# Patient Record
Sex: MALE | Race: WHITE | Employment: FULL TIME
[De-identification: names, ages, dates, MRNs, and addresses within clinical notes are randomized per-mention and may not be internally consistent; named-entity substitution may affect disease eponyms.]

---

## 2022-04-13 ENCOUNTER — NURSE TRIAGE (OUTPATIENT)
Dept: OTHER | Facility: CLINIC | Age: 57
End: 2022-04-13

## 2022-04-13 NOTE — TELEPHONE ENCOUNTER
Received call from Moy Mayberry at [facility] with [facility]. Subjective: Caller states \"I have been feeling SOB more so now than before. I had to quit smoking because I would get SOB really easily\"     Current Symptoms:   SOB on exertion  Fatigued faster  Nasal drainage    Denies wheezing, swelling, chest pain, cough, or fever  Hx: quit smoking about 5 years ago, familial hx of MI and afib,    Onset: a few months ago; gradual    Associated Symptoms: NA    Pain Severity: denies    Temperature: denies    What has been tried: nothing    LMP: NA Pregnant: NA    Recommended disposition: See HCP within 4 Hours (or PCP triage)     Pt is not established with a PCP. Pt advised to utilize UC/ED for now but would be transferred to [facility]) to establish care. Care advice provided, patient verbalizes understanding; denies any other questions or concerns; instructed to call back for any new or worsening symptoms. Patient/Caller agrees with recommended disposition; writer provided warm transfer to Anais Dasilva at [facility] for appointment scheduling     Attention Provider: Thank you for allowing me to participate in the care of your patient. The patient was connected to triage in response to information provided to the ECC/PSC. Please do not respond through this encounter as the response is not directed to a shared pool.       Reason for Disposition   [1] Longstanding difficulty breathing (e.g., CHF, COPD, emphysema) AND [2] WORSE than normal    Protocols used: BREATHING DIFFICULTY-ADULT-AH

## 2022-04-14 ENCOUNTER — OFFICE VISIT (OUTPATIENT)
Dept: INTERNAL MEDICINE | Age: 57
End: 2022-04-14
Payer: COMMERCIAL

## 2022-04-14 VITALS
SYSTOLIC BLOOD PRESSURE: 112 MMHG | OXYGEN SATURATION: 96 % | DIASTOLIC BLOOD PRESSURE: 82 MMHG | WEIGHT: 182 LBS | HEART RATE: 96 BPM | HEIGHT: 72 IN | BODY MASS INDEX: 24.65 KG/M2

## 2022-04-14 DIAGNOSIS — Z87.891 PERSONAL HISTORY OF TOBACCO USE: Primary | ICD-10-CM

## 2022-04-14 DIAGNOSIS — R53.83 FATIGUE, UNSPECIFIED TYPE: ICD-10-CM

## 2022-04-14 DIAGNOSIS — J44.9 CHRONIC OBSTRUCTIVE PULMONARY DISEASE, UNSPECIFIED COPD TYPE (HCC): ICD-10-CM

## 2022-04-14 DIAGNOSIS — Z87.891 PERSONAL HISTORY OF TOBACCO USE: ICD-10-CM

## 2022-04-14 DIAGNOSIS — R06.02 SOB (SHORTNESS OF BREATH): ICD-10-CM

## 2022-04-14 LAB
ALBUMIN SERPL-MCNC: 4.5 G/DL (ref 3.5–4.6)
ALP BLD-CCNC: 83 U/L (ref 35–104)
ALT SERPL-CCNC: <5 U/L (ref 0–41)
ANION GAP SERPL CALCULATED.3IONS-SCNC: 10 MEQ/L (ref 9–15)
AST SERPL-CCNC: <5 U/L (ref 0–40)
BASOPHILS ABSOLUTE: 0.1 K/UL (ref 0–0.2)
BASOPHILS RELATIVE PERCENT: 0.9 %
BILIRUB SERPL-MCNC: 0.4 MG/DL (ref 0.2–0.7)
BUN BLDV-MCNC: 13 MG/DL (ref 6–20)
CALCIUM SERPL-MCNC: 9.2 MG/DL (ref 8.5–9.9)
CHLORIDE BLD-SCNC: 103 MEQ/L (ref 95–107)
CHOLESTEROL, TOTAL: 187 MG/DL (ref 0–199)
CO2: 26 MEQ/L (ref 20–31)
CREAT SERPL-MCNC: 0.77 MG/DL (ref 0.7–1.2)
EOSINOPHILS ABSOLUTE: 0.2 K/UL (ref 0–0.7)
EOSINOPHILS RELATIVE PERCENT: 2.1 %
GFR AFRICAN AMERICAN: >60
GFR NON-AFRICAN AMERICAN: >60
GLOBULIN: 3.1 G/DL (ref 2.3–3.5)
GLUCOSE BLD-MCNC: 91 MG/DL (ref 70–99)
HCT VFR BLD CALC: 46 % (ref 42–52)
HDLC SERPL-MCNC: 41 MG/DL (ref 40–59)
HEMOGLOBIN: 15.8 G/DL (ref 14–18)
LDL CHOLESTEROL CALCULATED: 128 MG/DL (ref 0–129)
LYMPHOCYTES ABSOLUTE: 1.7 K/UL (ref 1–4.8)
LYMPHOCYTES RELATIVE PERCENT: 22.9 %
MCH RBC QN AUTO: 29.6 PG (ref 27–31.3)
MCHC RBC AUTO-ENTMCNC: 34.3 % (ref 33–37)
MCV RBC AUTO: 86.4 FL (ref 80–100)
MONOCYTES ABSOLUTE: 0.5 K/UL (ref 0.2–0.8)
MONOCYTES RELATIVE PERCENT: 6.2 %
NEUTROPHILS ABSOLUTE: 5 K/UL (ref 1.4–6.5)
NEUTROPHILS RELATIVE PERCENT: 67.9 %
PDW BLD-RTO: 13.9 % (ref 11.5–14.5)
PLATELET # BLD: 257 K/UL (ref 130–400)
POTASSIUM SERPL-SCNC: 5.5 MEQ/L (ref 3.4–4.9)
RBC # BLD: 5.32 M/UL (ref 4.7–6.1)
SODIUM BLD-SCNC: 139 MEQ/L (ref 135–144)
TOTAL PROTEIN: 7.6 G/DL (ref 6.3–8)
TRIGL SERPL-MCNC: 88 MG/DL (ref 0–150)
WBC # BLD: 7.3 K/UL (ref 4.8–10.8)

## 2022-04-14 PROCEDURE — 93000 ELECTROCARDIOGRAM COMPLETE: CPT | Performed by: FAMILY MEDICINE

## 2022-04-14 PROCEDURE — G0296 VISIT TO DETERM LDCT ELIG: HCPCS | Performed by: FAMILY MEDICINE

## 2022-04-14 PROCEDURE — 99204 OFFICE O/P NEW MOD 45 MIN: CPT | Performed by: FAMILY MEDICINE

## 2022-04-14 SDOH — ECONOMIC STABILITY: FOOD INSECURITY: WITHIN THE PAST 12 MONTHS, YOU WORRIED THAT YOUR FOOD WOULD RUN OUT BEFORE YOU GOT MONEY TO BUY MORE.: NEVER TRUE

## 2022-04-14 SDOH — ECONOMIC STABILITY: FOOD INSECURITY: WITHIN THE PAST 12 MONTHS, THE FOOD YOU BOUGHT JUST DIDN'T LAST AND YOU DIDN'T HAVE MONEY TO GET MORE.: NEVER TRUE

## 2022-04-14 ASSESSMENT — ENCOUNTER SYMPTOMS
SORE THROAT: 0
VOICE CHANGE: 0
VOMITING: 0
CHEST TIGHTNESS: 0
COUGH: 1
SHORTNESS OF BREATH: 1
DIARRHEA: 0
TROUBLE SWALLOWING: 0
BLOOD IN STOOL: 0
ABDOMINAL PAIN: 0
CHOKING: 0

## 2022-04-14 ASSESSMENT — PATIENT HEALTH QUESTIONNAIRE - PHQ9
1. LITTLE INTEREST OR PLEASURE IN DOING THINGS: 0
4. FEELING TIRED OR HAVING LITTLE ENERGY: 0
SUM OF ALL RESPONSES TO PHQ9 QUESTIONS 1 & 2: 0
7. TROUBLE CONCENTRATING ON THINGS, SUCH AS READING THE NEWSPAPER OR WATCHING TELEVISION: 0
2. FEELING DOWN, DEPRESSED OR HOPELESS: 0
3. TROUBLE FALLING OR STAYING ASLEEP: 0
10. IF YOU CHECKED OFF ANY PROBLEMS, HOW DIFFICULT HAVE THESE PROBLEMS MADE IT FOR YOU TO DO YOUR WORK, TAKE CARE OF THINGS AT HOME, OR GET ALONG WITH OTHER PEOPLE: 0
SUM OF ALL RESPONSES TO PHQ QUESTIONS 1-9: 0
5. POOR APPETITE OR OVEREATING: 0
SUM OF ALL RESPONSES TO PHQ QUESTIONS 1-9: 0
SUM OF ALL RESPONSES TO PHQ QUESTIONS 1-9: 0
9. THOUGHTS THAT YOU WOULD BE BETTER OFF DEAD, OR OF HURTING YOURSELF: 0
8. MOVING OR SPEAKING SO SLOWLY THAT OTHER PEOPLE COULD HAVE NOTICED. OR THE OPPOSITE, BEING SO FIGETY OR RESTLESS THAT YOU HAVE BEEN MOVING AROUND A LOT MORE THAN USUAL: 0
SUM OF ALL RESPONSES TO PHQ QUESTIONS 1-9: 0
6. FEELING BAD ABOUT YOURSELF - OR THAT YOU ARE A FAILURE OR HAVE LET YOURSELF OR YOUR FAMILY DOWN: 0

## 2022-04-14 ASSESSMENT — SOCIAL DETERMINANTS OF HEALTH (SDOH): HOW HARD IS IT FOR YOU TO PAY FOR THE VERY BASICS LIKE FOOD, HOUSING, MEDICAL CARE, AND HEATING?: NOT HARD AT ALL

## 2022-04-14 NOTE — PROGRESS NOTES
Subjective:      Patient ID: Shaunna Lilly is a 60 y.o. male who presents today for:  Chief Complaint   Patient presents with   [address] PCP. Pt had a little milk in coffee at 730. Pt is having SOB, Since he quit smoking 3yrs ago. Says by the EOD he is very fatigued. HPI Andres is a 49-year-old man with a history of 30+ years of smoking, however he quit 2 to 3 years ago. Patient has shortness of breath that has slowly gotten worse over the past few years. He stopped smoking due to the shortness of breath. Patient does not exercise much he does get more short of breath when he walks upstairs. No chest pain at rest or with exertion. No history of cardiac disease. Shortness of breath is always there and makes him feel very tired by the end of the day. Occasional cough with small amount of sputum production. Cough and sputum production have improved since 2 to 3 years ago. No fever. No history of Covid infection. Patient has had 2 of his Covid vaccines but no blisters. History reviewed. No pertinent past medical history. Past Surgical History:   Procedure Laterality Date    APPENDECTOMY      EYE SURGERY      TONSILLECTOMY      UPPER GASTROINTESTINAL ENDOSCOPY  11/30/12    DR Sky Berrios     Social History     Socioeconomic History    Marital status:      Spouse name: Not on file    Number of children: Not on file    Years of education: Not on file    Highest education level: Not on file   Occupational History    Not on file   Tobacco Use    Smoking status: Former Smoker     Packs/day: 1.00     Years: 40.00     Pack years: 40.00     Types: Cigarettes     Start date: 1982     Quit date: 2019     Years since quitting: 3.2    Smokeless tobacco: Never Used   Substance and Sexual Activity    Alcohol use:  Yes    Drug use: No    Sexual activity: Not on file   Other Topics Concern    Not on file   Social History Narrative    Not on file     Social Determinants of Health     Financial Resource Strain: Low Risk     Difficulty of Paying Living Expenses: Not hard at all   Food Insecurity: No Food Insecurity    Worried About Running Out of Food in the Last Year: Never true    [place]ia of Food in the Last Year: Never true   Transportation Needs:     Lack of Transportation (Medical): Not on file    Lack of Transportation (Non-Medical): Not on file   Physical Activity:     Days of Exercise per Week: Not on file    Minutes of Exercise per Session: Not on file   Stress:     Feeling of Stress : Not on file   Social Connections:     Frequency of Communication with Friends and Family: Not on file    Frequency of Social Gatherings with Friends and Family: Not on file    Attends Mor[employer] Services: Not on file    Active Member of [address] [place] [address] [employer] or Organizations: Not on file    Attends Club or Organization Meetings: Not on file    Marital Status: Not on file   Intimate Partner Violence:     Fear of Current or Ex-Partner: Not on file    Emotionally Abused: Not on file    Physically Abused: Not on file    Sexually Abused: Not on file   Housing Stability:     Unable to Pay for Housing in the Last Year: Not on file    Number of [place] in the Last Year: Not on file    Unstable Housing in the Last Year: Not on file     Family History   Problem Relation Age of Onset    Other Mother     Diabetes Father     Atrial Fibrillation Brother      No Known Allergies      Review of Systems   Constitutional: Negative for diaphoresis, fatigue and fever. HENT: Negative for congestion, sore throat, trouble swallowing and voice change. Eyes: Negative for visual disturbance. Respiratory: Positive for cough and shortness of breath. Negative for choking and chest tightness. Cardiovascular: Negative for chest pain, palpitations and leg swelling. Gastrointestinal: Negative for abdominal pain, blood in stool, diarrhea and vomiting.    Genitourinary: Negative for decreased urine volume and difficulty urinating. Musculoskeletal: Negative for joint swelling and myalgias. Skin: Negative for rash. Skin plaque on stomach for 2-3 years, his father has same one per patient   Allergic/Immunologic: Negative for immunocompromised state. Neurological: Negative for dizziness, syncope, speech difficulty, weakness, light-headedness and headaches. Hematological: Negative for adenopathy. Psychiatric/Behavioral: Negative for confusion and self-injury. Objective:   /82 (Site: Right Upper Arm, Position: Sitting, Cuff Size: Large Adult)   Pulse 96   Ht 5' 11.5\" (1.816 m)   Wt 182 lb (82.6 kg)   SpO2 96%   BMI 25.03 kg/m²     Physical Exam  Vitals reviewed. Constitutional:       General: He is not in acute distress. Appearance: Normal appearance. He is not ill-appearing or toxic-appearing. HENT:      Head: Normocephalic. Mouth/Throat:      Mouth: Mucous membranes are moist.      Pharynx: Oropharynx is clear. No oropharyngeal exudate or posterior oropharyngeal erythema. Eyes:      Extraocular Movements: Extraocular movements intact. Conjunctiva/sclera: Conjunctivae normal.      Pupils: Pupils are equal, round, and reactive to light. Cardiovascular:      Rate and Rhythm: Normal rate and regular rhythm. Heart sounds: Normal heart sounds. Pulmonary:      Effort: Pulmonary effort is normal. No respiratory distress. Breath sounds: No wheezing, rhonchi or rales. Comments: Decreased breath sounds throughout  Abdominal:      General: Abdomen is flat. Bowel sounds are normal. There is no distension. Palpations: Abdomen is soft. There is no mass. Tenderness: There is no abdominal tenderness. There is no guarding. Musculoskeletal:         General: Normal range of motion. Cervical back: Normal range of motion and neck supple. Right lower leg: No edema. Left lower leg: No edema. Lymphadenopathy:      Cervical: No cervical adenopathy. Skin:     General: Skin is warm and dry. Findings: No rash. Comments: [id number] dry scaly plaque on right abdomen   Neurological:      General: No focal deficit present. Mental Status: He is alert and oriented to person, place, and time. Cranial Nerves: No cranial nerve deficit. Sensory: No sensory deficit. Motor: No weakness. Psychiatric:         Mood and Affect: Mood normal.         Behavior: Behavior normal.         Thought Content: Thought content normal.         Judgment: Judgment normal.         Assessment:       Diagnosis Orders   1. Personal history of tobacco use  Cologuard    Lipid Panel    CBC with Auto Differential    Comprehensive Metabolic Panel    [place] VISIT TO DISCUSS LUNG CA SCREEN W LDCT    CT Lung Screen (Annual)    Ambulatory referral to Pulmonology   2. Fatigue, unspecified type  Cologuard    Lipid Panel    CBC with Auto Differential    Comprehensive Metabolic Panel    [place] VISIT TO DISCUSS LUNG CA SCREEN W LDCT    CT Lung Screen (Annual)    EKG 12 lead    EKG 12 lead    Ambulatory referral to Pulmonology   3. SOB (shortness of breath)  Cologuard    Lipid Panel    CBC with Auto Differential    Comprehensive Metabolic Panel    [place] VISIT TO DISCUSS LUNG CA SCREEN W LDCT    CT Lung Screen (Annual)    Ambulatory referral to Pulmonology    fluticasone-salmeterol (ADVAIR DISKUS) 250-50 MCG/DOSE AEPB   4. Chronic obstructive pulmonary disease, unspecified COPD type ([facility] [address]  fluticasone-salmeterol (ADVAIR DISKUS) 250-50 MCG/DOSE AEPB         Plan:      Orders Placed This Encounter   Procedures    Cologuard    CT Lung Screen (Annual)     Age: Patient is 60 y.o.     Smoking History: Social History    Tobacco Use      Smoking status: Former Smoker        Packs/day: 1.00        Years: 40.00        Pack years: 40        Types: Cigarettes        Start date: 1982        Quit date: 2019        Years since quitting: 3.2      Smokeless tobacco: Never Used    Alcohol use: Yes    Drug use: 1 puff into the lungs every 12 hours     Dispense:  60 each     Refill:  3       Return in about 3 weeks (around 5/5/2022) for symptom review. Return to clinic sooner if symptoms worsen. Go to emergency room with severe shortness of breath, chest pain, dizziness, syncope, or confusion. I recommended patient see dermatology for plaque on right abdomen, however patient refused at this time. Patient is also not interested in any further cardiac evaluation at this time-he wishes to focus on his breathing and see pulmonology first.  I discussed with him that shortness of breath is likely secondary to COPD from greater than 30 years of smoking but that it can also be caused by cardiac disease.       Nazario Orozco MD

## 2022-04-14 NOTE — PROGRESS NOTES
Low Dose CT (LDCT) Lung Screening criteria met:     Age 50-77(Medicare) or 50-80 ([facility])   Pack year smoking >20   Still smoking or less than 15 year since quit   No sign or symptoms of lung cancer   > 11 months since last LDCT     Risks and benefits of lung cancer screening with LDCT scans discussed:    Significance of positive screen - False-positive LDCT results often occur. 95% of all positive results do not lead to a diagnosis of cancer. Usually further imaging can resolve most false-positive results; however, some patients may require invasive procedures. Over diagnosis risk - 10% to 12% of screen-detected lung cancer cases are over diagnosed--that is, the cancer would not have been detected in the patient's lifetime without the screening. Need for follow up screens annually to continue lung cancer screening effectiveness     Risks associated with radiation from annual LDCT- Radiation exposure is about the same as for a mammogram, which is about 1/3 of the annual background radiation exposure from everyday life. Starting screening at age 54 is not likely to increase cancer risk from radiation exposure. Patients with comorbidities resulting in life expectancy of < 10 years, or that would preclude treatment of an abnormality identified on CT, should not be screened due to lack of benefit.     To obtain maximal benefit from this screening, smoking cessation and long-term abstinence from smoking is critical

## 2022-04-18 ENCOUNTER — TELEPHONE (OUTPATIENT)
Dept: CASE MANAGEMENT | Age: 57
End: 2022-04-18

## 2022-04-18 DIAGNOSIS — E87.5 HYPERKALEMIA: Primary | ICD-10-CM

## 2022-04-18 NOTE — TELEPHONE ENCOUNTER
Physician documentation on smoking history and CT Lung Screening reviewed. All required documentation complete. Patient is a former smoker (quit 2019) with a 40 pack year history ( 1 ppd x 40 years) per physician documentation.

## 2022-04-18 NOTE — PROGRESS NOTES
I spoke with patient Friday on the phone to notify him that his potassium level was high. He was feeling well- no chest pain, palpitations or dizziness. His breathing was improved. I told him to go to ER if he is feeling unwell and to have his potassium level repeated when office reopened. Pt was agreeable.

## 2022-04-26 ENCOUNTER — HOSPITAL ENCOUNTER (OUTPATIENT)
Dept: CT IMAGING | Age: 57
Discharge: HOME | End: 2022-04-28
Payer: COMMERCIAL

## 2022-04-26 DIAGNOSIS — Z87.891 PERSONAL HISTORY OF TOBACCO USE: ICD-10-CM

## 2022-04-26 DIAGNOSIS — R53.83 FATIGUE, UNSPECIFIED TYPE: ICD-10-CM

## 2022-04-26 DIAGNOSIS — R06.02 SOB (SHORTNESS OF BREATH): ICD-10-CM

## 2022-04-26 PROCEDURE — 71271 CT THORAX LUNG CANCER SCR C-: CPT

## 2022-05-05 ENCOUNTER — OFFICE VISIT (OUTPATIENT)
Dept: INTERNAL MEDICINE | Age: 57
End: 2022-05-05
Payer: COMMERCIAL

## 2022-05-05 VITALS
HEIGHT: 72 IN | DIASTOLIC BLOOD PRESSURE: 82 MMHG | OXYGEN SATURATION: 96 % | WEIGHT: 189 LBS | TEMPERATURE: 97.2 F | HEART RATE: 103 BPM | SYSTOLIC BLOOD PRESSURE: 120 MMHG | BODY MASS INDEX: 25.6 KG/M2

## 2022-05-05 DIAGNOSIS — J43.9 PULMONARY EMPHYSEMA, UNSPECIFIED EMPHYSEMA TYPE (HCC): ICD-10-CM

## 2022-05-05 DIAGNOSIS — E87.5 HYPERKALEMIA: ICD-10-CM

## 2022-05-05 DIAGNOSIS — Z00.00 HEALTH CARE MAINTENANCE: ICD-10-CM

## 2022-05-05 DIAGNOSIS — R94.31 ABNORMAL EKG: ICD-10-CM

## 2022-05-05 DIAGNOSIS — Z00.00 HEALTH CARE MAINTENANCE: Primary | ICD-10-CM

## 2022-05-05 LAB
ANION GAP SERPL CALCULATED.3IONS-SCNC: 16 MEQ/L (ref 9–15)
BUN BLDV-MCNC: 12 MG/DL (ref 6–20)
CALCIUM SERPL-MCNC: 9.4 MG/DL (ref 8.5–9.9)
CHLORIDE BLD-SCNC: 97 MEQ/L (ref 95–107)
CO2: 24 MEQ/L (ref 20–31)
CREAT SERPL-MCNC: 0.92 MG/DL (ref 0.7–1.2)
GFR AFRICAN AMERICAN: >60
GFR NON-AFRICAN AMERICAN: >60
GLUCOSE BLD-MCNC: 100 MG/DL (ref 70–99)
POTASSIUM SERPL-SCNC: 4.8 MEQ/L (ref 3.4–4.9)
SODIUM BLD-SCNC: 137 MEQ/L (ref 135–144)

## 2022-05-05 PROCEDURE — 99213 OFFICE O/P EST LOW 20 MIN: CPT | Performed by: FAMILY MEDICINE

## 2022-05-05 RX ORDER — ALBUTEROL SULFATE 90 UG/1
2 AEROSOL, METERED RESPIRATORY (INHALATION) EVERY 6 HOURS PRN
Qty: 1 EACH | Refills: 3 | Status: SHIPPED | OUTPATIENT
Start: 2022-05-05

## 2022-05-05 ASSESSMENT — ENCOUNTER SYMPTOMS
SHORTNESS OF BREATH: 1
VOMITING: 0
TROUBLE SWALLOWING: 0
DIARRHEA: 0
ABDOMINAL PAIN: 0
SORE THROAT: 0

## 2022-05-05 NOTE — PROGRESS NOTES
Subjective:      Patient ID: Keke Longoria is a 60 y.o. male who presents today for:  Chief Complaint   Patient presents with    Follow-up     SOB, inhaler disk is helping, go over lung screen CT       HPI  Patient states he has noticed some improvement since starting the Advair discus. However he still has periods of shortness of breath especially when he is exerting himself. His difficulty breathing has been consistent for several years now. No sudden worsening of breathing. No chest pain. No history of coronary artery disease. Patient has appointment to see pulmonology next week. No fever. No sputum. No palpitations or dizziness. History reviewed. No pertinent past medical history. Past Surgical History:   Procedure Laterality Date    APPENDECTOMY      EYE SURGERY      TONSILLECTOMY      UPPER GASTROINTESTINAL ENDOSCOPY  11/30/12    DR Amrit Avendaño     Social History     Socioeconomic History    Marital status:      Spouse name: Not on file    Number of children: Not on file    Years of education: Not on file    Highest education level: Not on file   Occupational History    Not on file   Tobacco Use    Smoking status: Former Smoker     Packs/day: 1.00     Years: 40.00     Pack years: 40.00     Types: Cigarettes     Start date: 1982     Quit date: 2019     Years since quitting: 3.3    Smokeless tobacco: Never Used   Substance and Sexual Activity    Alcohol use: Yes    Drug use: No    Sexual activity: Not on file   Other Topics Concern    Not on file   Social History Narrative    Not on file     Social Determinants of Health     Financial Resource Strain: Low Risk     Difficulty of Paying Living Expenses: Not hard at all   Food Insecurity: No Food Insecurity    Worried About Running Out of Food in the Last Year: Never true    [address] in the Last Year: Never true   Transportation Needs:     Lack of Transportation (Medical):  Not on file    Lack of Transportation (Non-Medical): Not on file   Physical Activity:     Days of Exercise per Week: Not on file    Minutes of Exercise per Session: Not on file   Stress:     Feeling of Stress : Not on file   Social Connections:     Frequency of Communication with Friends and Family: Not on file    Frequency of Social Gatherings with Friends and Family: Not on file    Attends Anabaptism Services: Not on file    Active Member of [address] [place] [address] or Organizations: Not on file    Attends Club or Organization Meetings: Not on file    Marital Status: Not on file   Intimate Partner Violence:     Fear of Current or Ex-Partner: Not on file    Emotionally Abused: Not on file    Physically Abused: Not on file    Sexually Abused: Not on file   Housing Stability:     Unable to Pay for Housing in the Last Year: Not on file    Number of [place] in the Last Year: Not on file    Unstable Housing in the Last Year: Not on file     Family History   Problem Relation Age of Onset    Other Mother     Diabetes Father     Atrial Fibrillation Brother      No Known Allergies      Review of Systems   Constitutional: Negative for diaphoresis, fatigue and fever. HENT: Negative for sore throat and trouble swallowing. Eyes: Negative for visual disturbance. Respiratory: Positive for shortness of breath. Cardiovascular: Negative for chest pain and palpitations. Gastrointestinal: Negative for abdominal pain, diarrhea and vomiting. Genitourinary: Negative for difficulty urinating. Musculoskeletal: Negative for joint swelling and myalgias. Skin: Negative for rash. Neurological: Negative for dizziness, syncope, weakness and headaches. Psychiatric/Behavioral: Negative for dysphoric mood and suicidal ideas.        Objective:   /82 (Site: Right Upper Arm, Position: Sitting, Cuff Size: Large Adult)   Pulse 103   Temp 97.2 °F (36.2 °C) (Infrared)   Ht 5' 11.5\" (1.816 m)   Wt 189 lb (85.7 kg)   SpO2 96%   BMI 25.99 kg/m²     Physical Exam  Vitals reviewed. Constitutional:       General: He is not in acute distress. Appearance: Normal appearance. He is not ill-appearing. HENT:      Head: Normocephalic. Mouth/Throat:      Mouth: Mucous membranes are moist.      Pharynx: Oropharynx is clear. No oropharyngeal exudate or posterior oropharyngeal erythema. Eyes:      Extraocular Movements: Extraocular movements intact. Conjunctiva/sclera: Conjunctivae normal.      Pupils: Pupils are equal, round, and reactive to light. Cardiovascular:      Rate and Rhythm: Normal rate and regular rhythm. Heart sounds: Normal heart sounds. Pulmonary:      Effort: Pulmonary effort is normal. No respiratory distress. Breath sounds: No wheezing. Comments: Decreased breath sounds through out  Abdominal:      General: Abdomen is flat. Bowel sounds are normal. There is no distension. Palpations: Abdomen is soft. Tenderness: There is no abdominal tenderness. There is no guarding. Musculoskeletal:         General: Normal range of motion. Cervical back: Normal range of motion and neck supple. Right lower leg: No edema. Left lower leg: No edema. Lymphadenopathy:      Cervical: No cervical adenopathy. Skin:     General: Skin is warm and dry. Findings: No rash. Neurological:      General: No focal deficit present. Mental Status: He is alert and oriented to person, place, and time. Cranial Nerves: No cranial nerve deficit. Sensory: No sensory deficit. Motor: No weakness. Psychiatric:         Mood and Affect: Mood normal.         Behavior: Behavior normal.         Thought Content: Thought content normal.         Judgment: Judgment normal.         Assessment:       Diagnosis Orders   1. Health care maintenance  External Referral to Cardiology    Basic Metabolic Panel    HIV Screen    Hepatitis C Antibody   2.  Pulmonary emphysema, unspecified emphysema type [facility])  External Referral to Cardiology    Basic Metabolic Panel    HIV Screen    Hepatitis C Antibody    albuterol sulfate  (90 Base) MCG/ACT inhaler   3. Hyperkalemia  External Referral to Cardiology    Basic Metabolic Panel    HIV Screen    Hepatitis C Antibody         Plan:      Orders Placed This Encounter   Procedures    Basic Metabolic Panel     Standing Status:   Future     Number of Occurrences:   1     Standing Expiration Date:   5/5/2023    HIV Screen     Standing Status:   Future     Number of Occurrences:   1     Standing Expiration Date:   5/5/2023    Hepatitis C Antibody     Standing Status:   Future     Number of Occurrences:   1     Standing Expiration Date:   5/5/2023    External Referral to Cardiology     Referral Priority:   Routine     Referral Type:   Eval and Treat     Referral Reason:   Specialty Services Required     Referred to Provider:   Pedrito Tan MD     Requested Specialty:   Cardiology     Number of Visits Requested:   1     Orders Placed This Encounter   Medications    albuterol sulfate  (90 Base) MCG/ACT inhaler     Sig: Inhale 2 puffs into the lungs every 6 hours as needed for Wheezing     Dispense:  1 each     Refill:  3       Return in about 4 weeks (around 6/2/2022). Recommended patient see cardiology due to abnormal EKG. Patient plans to see pulmonology next week. Advised patient to go to emergency room with increased shortness of breath, fever, chest pain, dizziness, palpitations or other serious/severe symptom.   Sukh Burdick MD

## 2022-05-05 NOTE — PATIENT INSTRUCTIONS
Patient Education        Chronic Obstructive Pulmonary Disease (COPD): Care Instructions  Overview     Chronic obstructive pulmonary disease (COPD) is a lung disease that makes it hard to breathe. With COPD, the airways that lead to the lungs are narrowed, and the tiny air sacs in the lungs are damaged and lose their stretch. People with COPD have decreased airflow in and out of the lungs, which makes it hard to breathe. The airways also can get clogged with thick mucus. Cigarettesmoking is a major cause of COPD. Although there is no cure for COPD, you can slow its progress. Following your treatment plan and taking care of yourself can help you feel better and livelonger. Follow-up care is a key part of your treatment and safety. Be sure to make and go to all appointments, and call your doctor if you are having problems. It's also a good idea to know your test results and keep alist of the medicines you take. How can you care for yourself at home? Staying healthy     Do not smoke. This is the most important step you can take to prevent more damage to your lungs. If you need help quitting, talk to your doctor about stop-smoking programs and medicines. These can increase your chances of quitting for good.      Avoid colds and other infections. Get the pneumococcal and whooping cough (pertussis) vaccines. If you have had these vaccines before, ask your doctor if you need another dose. Get the flu vaccine every fall. If you must be around people with colds or the flu, wash your hands often.      Avoid secondhand smoke and air pollution. Try to stay inside with your windows closed when air pollution is bad. Medicines and oxygen therapy     Take your medicines exactly as prescribed. Call your doctor if you think you are having a problem with your medicine. You may be taking medicines such as:  ? Bronchodilators. These help open your airways and make breathing easier.  They are either short-acting (work for 4 to 9 hours) or long-acting (work for 12 to 24 hours). You inhale most bronchodilators, so they start to act quickly. Always carry your quick-relief inhaler with you in case you need it. ? Corticosteroids (prednisone, budesonide). These reduce airway inflammation. They come in inhaled or pill form.      Ask your doctor or pharmacist if you are using each type of inhaler correctly. With correct use, the medicine is more likely to get to your lungs.      See if a spacer is right for you. A spacer may also help you get more inhaled medicine to your lungs. If you use one, ask how to use it properly.      Do not take any vitamins, over-the-counter medicine, or herbal products without talking to your doctor first.      If your doctor prescribed antibiotics, take them as directed. Do not stop taking them just because you feel better. You need to take the full course of antibiotics.      If you use oxygen therapy, use the flow rate your doctor has recommended. Don't change it without talking to your doctor first. Oxygen therapy boosts the amount of oxygen in your blood and helps you breathe easier. Activity     Get regular exercise. Walking is an easy way to get exercise. Start out slowly, and walk a little more each day.      Pay attention to your breathing. You are exercising too hard if you can't talk while you exercise.      Take short rest breaks when doing household chores and other activities.      Learn breathing methods--such as breathing through pursed lips--to help you become less short of breath.      If your doctor has not set you up with a pulmonary rehabilitation program, ask if rehab is right for you. Rehab includes exercise programs, education about your disease and how to manage it, help with diet and other changes, and emotional support.    Diet     Eat regular, healthy meals.      Use bronchodilators about 1 hour before you eat to make it easier to eat.      Eat several smaller, frequent meals to prevent getting too full. A full stomach can push on the muscle that helps you breathe (your diaphragm) and make it harder to breathe.      Drink beverages at the end of the meal.      Avoid foods that are hard to chew.      Eat foods that contain protein so you don't lose muscle mass.      Talk with your doctor if you gain too much weight or if you lose weight without trying. Mental health     Talk to your family, friends, or a therapist about your feelings. Some people feel frightened, angry, hopeless, helpless, and even guilty. Talking openly about feelings may help you cope. If these feelings last, talk to your doctor. When should you call for help? Call 911 anytime you think you may need emergency care. For example, call if:     You have severe trouble breathing.      You are having chest pain that is different or worse than usual.   Call your doctor now or seek immediate medical care if:     You have new or worse trouble breathing.      You cough up blood.      You have a fever.      You have feelings of anxiety or depression. Watch closely for changes in your health, and be sure to contact your doctor if:     You cough more deeply or more often, especially if you notice more mucus or a change in the color of your mucus.      You have new or worse swelling in your legs or belly.      You are not getting better as expected. Where can you learn more? Go to [email or web address]://[email or web address]. org and sign in to your [employer] account. Enter J808 in the [facility] box to learn more about \"Chronic Obstructive Pulmonary Disease (COPD): Care Instructions. \"     If you do not have an account, please click on the \"Sign Up Now\" link. Current as of: July 6, 2021               Content Version: 13.2  © 1378-6731 [employer]wise, [employer]. Care instructions adapted under license by [facility] ([facility]).  If you have questions about a medical condition or this instruction, always ask your healthcare professional. [address] any warranty or liability for your use of this information.

## 2022-05-06 LAB
HEPATITIS C ANTIBODY: NONREACTIVE
HIV AG/AB: NONREACTIVE

## 2022-05-16 ENCOUNTER — OFFICE VISIT (OUTPATIENT)
Dept: PULMONOLOGY | Age: 57
End: 2022-05-16
Payer: COMMERCIAL

## 2022-05-16 VITALS
TEMPERATURE: 97.2 F | RESPIRATION RATE: 16 BRPM | SYSTOLIC BLOOD PRESSURE: 148 MMHG | WEIGHT: 190 LBS | HEIGHT: 72 IN | OXYGEN SATURATION: 99 % | HEART RATE: 83 BPM | DIASTOLIC BLOOD PRESSURE: 90 MMHG | BODY MASS INDEX: 25.73 KG/M2

## 2022-05-16 DIAGNOSIS — R91.8 LUNG NODULES: ICD-10-CM

## 2022-05-16 DIAGNOSIS — J44.9 CHRONIC OBSTRUCTIVE PULMONARY DISEASE, UNSPECIFIED COPD TYPE (HCC): Primary | ICD-10-CM

## 2022-05-16 DIAGNOSIS — Z87.891 HISTORY OF SMOKING: ICD-10-CM

## 2022-05-16 DIAGNOSIS — J44.9 CHRONIC OBSTRUCTIVE PULMONARY DISEASE, UNSPECIFIED COPD TYPE (HCC): ICD-10-CM

## 2022-05-16 PROCEDURE — G8427 DOCREV CUR MEDS BY ELIG CLIN: HCPCS | Performed by: INTERNAL MEDICINE

## 2022-05-16 PROCEDURE — 1036F TOBACCO NON-USER: CPT | Performed by: INTERNAL MEDICINE

## 2022-05-16 PROCEDURE — G8419 CALC BMI OUT NRM PARAM NOF/U: HCPCS | Performed by: INTERNAL MEDICINE

## 2022-05-16 PROCEDURE — 3017F COLORECTAL CA SCREEN DOC REV: CPT | Performed by: INTERNAL MEDICINE

## 2022-05-16 PROCEDURE — 99204 OFFICE O/P NEW MOD 45 MIN: CPT | Performed by: INTERNAL MEDICINE

## 2022-05-16 PROCEDURE — 3023F SPIROM DOC REV: CPT | Performed by: INTERNAL MEDICINE

## 2022-05-16 NOTE — PROGRESS NOTES
Subjective:     Tracy Esteves is a 60 y.o. male who complains today of:     Chief Complaint   Patient presents with    New Patient     Shortness of Breath on exertion per Dr. 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 No chest pain, no coughing, no lower extremity edema, no fever no chills, weight is stable, no nasal congestion or postnasal drip, no snoring or choking while asleep. No family history of lung disease, he quit smoking 3 years ago, smoked for 40 years sometimes up to 1.5 pack/day. Allergies:  Patient has no known allergies. History reviewed. No pertinent past medical history. Past Surgical History:   Procedure Laterality Date    APPENDECTOMY      EYE SURGERY      TONSILLECTOMY      UPPER GASTROINTESTINAL ENDOSCOPY  11/30/12    DR Gavino Roberts     Family History   Problem Relation Age of Onset    Other Mother     Diabetes Father     Atrial Fibrillation Brother      Social History     Socioeconomic History    Marital status:      Spouse name: Not on file    Number of children: Not on file    Years of education: Not on file    Highest education level: Not on file   Occupational History    Not on file   Tobacco Use    Smoking status: Former Smoker     Packs/day: 1.00     Years: 40.00     Pack years: 40.00     Types: Cigarettes     Start date: 1982     Quit date: 2019     Years since quitting: 3.3    Smokeless tobacco: Never Used   Vaping Use    Vaping Use: Former    Substances: Nicotine   Substance and Sexual Activity    Alcohol use:  Yes    Drug use: No    Sexual activity: Not on file   Other Topics Concern    Not on file   Social History Narrative    Not on file     Social Determinants of Health     Financial Resource Strain: Low Risk     Difficulty of Paying Living Expenses: Not hard at all   Food Insecurity: No Food Insecurity    Worried About [facility] in the Last Year: Never true    Ran Out of Food in the Last Year: Never true   Transportation Needs:     Lack of Transportation (Medical): Not on file    Lack of Transportation (Non-Medical): Not on file   Physical Activity:     Days of Exercise per Week: Not on file    Minutes of Exercise per Session: Not on file   Stress:     Feeling of Stress : Not on file   Social Connections:     Frequency of Communication with Friends and Family: Not on file    Frequency of Social Gatherings with Friends and Family: Not on file    Attends [employer] Services: Not on file    Active Member of [address] [place] [address] or Organizations: Not on file    Attends Club or Organization Meetings: Not on file    Marital Status: Not on file   Intimate Partner Violence:     Fear of Current or Ex-Partner: Not on file    Emotionally Abused: Not on file    Physically Abused: Not on file    Sexually Abused: Not on file   Housing Stability:     Unable to Pay for Housing in the Last Year: Not on file    Number of [place] in the Last Year: Not on file    Unstable Housing in the Last Year: Not on file         Review of Systems      ROS: 10 organs review of system is done including general, psychological, ENT, hematological, endocrine, respiratory, cardiovascular, gastrointestinal,musculoskeletal, neurological,  allergy and Immunology is done and is otherwise negative. Current Outpatient Medications   Medication Sig Dispense Refill    tiotropium-olodaterol (STIOLTO RESPIMAT) 2.5-2.5 MCG/ACT AERS Inhale 2 puffs into the lungs daily 2 each 0    albuterol sulfate  (90 Base) MCG/ACT inhaler Inhale 2 puffs into the lungs every 6 hours as needed for Wheezing 1 each 3     No current facility-administered medications for this visit.        Objective:     Vitals:    05/16/22 1101 05/16/22 1127   BP: (!) 146/84 (!) 148/90   Site: Right Upper Arm Left Upper Arm   Position: Sitting Sitting   Cuff Size: Medium Adult Medium Adult   Pulse: 83    Resp: 16    Temp: 97.2 °F (36.2 °C)    TempSrc: Infrared    SpO2: 99%    Weight: 190 lb (86.2 kg)    Height: 5' 11.5\" (1.816 m)          Physical Exam  Constitutional:       Appearance: He is well-developed. HENT:      Head: Normocephalic and atraumatic. Eyes:      General:         Left eye: No discharge. Conjunctiva/sclera: Conjunctivae normal.      Pupils: Pupils are equal, round, and reactive to light. Neck:      Vascular: No JVD. Cardiovascular:      Rate and Rhythm: Normal rate and regular rhythm. Heart sounds: Normal heart sounds. No murmur heard. No friction rub. No gallop. Pulmonary:      Effort: Pulmonary effort is normal. No respiratory distress. Breath sounds: Normal breath sounds. No wheezing or rales. Chest:      Chest wall: No tenderness. Abdominal:      General: Bowel sounds are normal.      Palpations: Abdomen is soft. Musculoskeletal:         General: No tenderness or deformity. Cervical back: Normal range of motion and neck supple. Right lower leg: No edema. Left lower leg: No edema. Skin:     General: Skin is warm and dry. Neurological:      Mental Status: He is alert and oriented to person, place, and time. Psychiatric:         Judgment: Judgment normal.         Imaging studies reviewed by me CT chest April 2022, shows centrilobular emphysema, multiple lung nodules, largest 6.4 mm. Lab results reviewed in chart  PFT none    Walk test done today, patient bar desaturation was 89%, he could not finish the 6 minutes. Assessment and Plan       Diagnosis Orders   1. Chronic obstructive pulmonary disease, unspecified COPD type ([address]  tiotropium-olodaterol (STIOLTO RESPIMAT) 2.5-2.5 MCG/ACT AERS    Full PFT Study With Bronchodilator    Pulse oximetry, overnight    Alpha-1-Antitrypsin W/ Phenotype   2. Lung nodules     3.  History of smoking · Probable COPD, patient symptomatic, will start Stiolto, DC Advair, will adjust treatment after PFT, I gave him samples of Stiolto. Will obtain nocturnal pulse oximetry, alpha-1 antitrypsin levels, and PFT. · Lung nodules, will need follow-up CT chest in 6 months. Will order on follow-up appointment. Orders Placed This Encounter   Procedures    Alpha-1-Antitrypsin W/ Phenotype     Standing Status:   Future     Standing Expiration Date:   5/16/2023    Pulse oximetry, overnight     Standing Status:   Future     Standing Expiration Date:   11/16/2023    Full PFT Study With Bronchodilator     Standing Status:   Future     Standing Expiration Date:   11/16/2023     Orders Placed This Encounter   Medications    tiotropium-olodaterol (STIOLTO RESPIMAT) 2.5-2.5 MCG/ACT AERS     Sig: Inhale 2 puffs into the lungs daily     Dispense:  2 each     Refill:  0            Discussed with patient the importance of exercise and weight control and  overall health and well-being. Reviewed with the patient: current clinical status, medications, activities and diet. Side effects, adverse effects of the medication prescribed today, as well as treatment plan and result expectations have been discussed with the patient who expresses understanding and desires to proceed. Return in about 6 weeks (around 6/27/2022).       Jade Alberto MD

## 2022-06-02 ENCOUNTER — OFFICE VISIT (OUTPATIENT)
Dept: INTERNAL MEDICINE | Age: 57
End: 2022-06-02
Payer: COMMERCIAL

## 2022-06-02 VITALS
SYSTOLIC BLOOD PRESSURE: 120 MMHG | TEMPERATURE: 98.2 F | DIASTOLIC BLOOD PRESSURE: 70 MMHG | WEIGHT: 191 LBS | OXYGEN SATURATION: 97 % | RESPIRATION RATE: 16 BRPM | BODY MASS INDEX: 25.87 KG/M2 | HEIGHT: 72 IN | HEART RATE: 95 BPM

## 2022-06-02 DIAGNOSIS — Z00.00 HEALTH CARE MAINTENANCE: ICD-10-CM

## 2022-06-02 DIAGNOSIS — J44.9 CHRONIC OBSTRUCTIVE PULMONARY DISEASE, UNSPECIFIED COPD TYPE (HCC): ICD-10-CM

## 2022-06-02 PROCEDURE — 99213 OFFICE O/P EST LOW 20 MIN: CPT | Performed by: FAMILY MEDICINE

## 2022-06-02 PROCEDURE — 90471 IMMUNIZATION ADMIN: CPT | Performed by: FAMILY MEDICINE

## 2022-06-02 PROCEDURE — 90715 TDAP VACCINE 7 YRS/> IM: CPT | Performed by: FAMILY MEDICINE

## 2022-06-02 ASSESSMENT — ENCOUNTER SYMPTOMS
CONSTIPATION: 0
COUGH: 0
TROUBLE SWALLOWING: 0
DIARRHEA: 0
SORE THROAT: 0
ABDOMINAL PAIN: 0
SHORTNESS OF BREATH: 1
VOMITING: 0

## 2022-06-02 NOTE — PROGRESS NOTES
Subjective:      Patient ID: Anastacio Michel is a 60 y.o. male who presents today for:  Chief Complaint   Patient presents with    Follow-up     patient is following up on SOB. patient is also following up after seeing the pulmonologist.       HPI patient states that his shortness of breath has slightly improved. He is using stiolto inhaler as directed by pulmonology. He has PFTs and overnight oximetry scheduled. Patient states that he had the J&J COVID-vaccine and both of his shingle vaccines. Patient plans to discuss pneumococcal vaccine with pulmonology. Patient has appointment with pulmonology May 30 and with cardiology on May 15. Patient complains of no increased shortness of pain, chest pain, palpitations or dizziness. No cough. Patient continues to get short of breath with exertion. Only needs rescue inhaler a couple times per week. History reviewed. No pertinent past medical history. Past Surgical History:   Procedure Laterality Date    APPENDECTOMY      EYE SURGERY      TONSILLECTOMY      UPPER GASTROINTESTINAL ENDOSCOPY  11/30/12    DR Darian Mccall     Social History     Socioeconomic History    Marital status:      Spouse name: Not on file    Number of children: Not on file    Years of education: Not on file    Highest education level: Not on file   Occupational History    Not on file   Tobacco Use    Smoking status: Former Smoker     Packs/day: 1.00     Years: 40.00     Pack years: 40.00     Types: Cigarettes     Start date: 1982     Quit date: 2019     Years since quitting: 3.4    Smokeless tobacco: Never Used   Vaping Use    Vaping Use: Former    Substances: Nicotine   Substance and Sexual Activity    Alcohol use:  Yes    Drug use: No    Sexual activity: Not on file   Other Topics Concern    Not on file   Social History Narrative    Not on file     Social Determinants of Health     Financial Resource Strain: Low Risk     Difficulty of Paying Living Expenses: Not hard at all   Food Insecurity: No Food Insecurity    Worried About [facility] in the Last Year: Never true    [place] of Food in the Last Year: Never true   Transportation Needs:     Lack of Transportation (Medical): Not on file    Lack of Transportation (Non-Medical): Not on file   Physical Activity:     Days of Exercise per Week: Not on file    Minutes of Exercise per Session: Not on file   Stress:     Feeling of Stress : Not on file   Social Connections:     Frequency of Communication with Friends and Family: Not on file    Frequency of Social Gatherings with Friends and Family: Not on file    Attends Latter day Services: Not on file    Active Member of [address] [place] [address] or Organizations: Not on file    Attends Club or Organization Meetings: Not on file    Marital Status: Not on file   Intimate Partner Violence:     Fear of Current or Ex-Partner: Not on file    Emotionally Abused: Not on file    Physically Abused: Not on file    Sexually Abused: Not on file   Housing Stability:     Unable to Pay for Housing in the Last Year: Not on file    Number of [place] in the Last Year: Not on file    Unstable Housing in the Last Year: Not on file     Family History   Problem Relation Age of Onset    Other Mother     Diabetes Father     Atrial Fibrillation Brother      No Known Allergies      Review of Systems   Constitutional: Negative for diaphoresis, fatigue and fever. HENT: Negative for sore throat and trouble swallowing. Eyes: Negative for visual disturbance. Respiratory: Positive for shortness of breath. Negative for cough. Cardiovascular: Negative for chest pain, palpitations and leg swelling. Gastrointestinal: Negative for abdominal pain, constipation, diarrhea and vomiting. Genitourinary: Negative for difficulty urinating. Musculoskeletal: Negative for joint swelling and myalgias. Skin: Negative for rash. Neurological: Negative for dizziness, syncope, weakness and headaches. Psychiatric/Behavioral: Negative for dysphoric mood and suicidal ideas. The patient is nervous/anxious (occ anxiety when becomes short of breath). Objective:   /70 (Site: Right Upper Arm, Position: Sitting, Cuff Size: Small Adult)   Pulse 95   Temp 98.2 °F (36.8 °C) (Oral)   Resp 16   Ht 5' 11.5\" (1.816 m)   Wt 191 lb (86.6 kg)   SpO2 97%   BMI 26.27 kg/m²     Physical Exam  Vitals reviewed. Constitutional:       General: He is not in acute distress. Appearance: Normal appearance. HENT:      Head: Normocephalic. Mouth/Throat:      Mouth: Mucous membranes are moist.      Pharynx: Oropharynx is clear. No oropharyngeal exudate or posterior oropharyngeal erythema. Eyes:      Extraocular Movements: Extraocular movements intact. Conjunctiva/sclera: Conjunctivae normal.      Pupils: Pupils are equal, round, and reactive to light. Cardiovascular:      Rate and Rhythm: Normal rate and regular rhythm. Heart sounds: Normal heart sounds. Pulmonary:      Effort: Pulmonary effort is normal.      Breath sounds: Normal breath sounds. No wheezing or rales. Abdominal:      General: Abdomen is flat. There is no distension. Palpations: Abdomen is soft. Tenderness: There is no abdominal tenderness. Musculoskeletal:         General: Normal range of motion. Cervical back: Normal range of motion and neck supple. Right lower leg: No edema. Left lower leg: No edema. Lymphadenopathy:      Cervical: No cervical adenopathy. Skin:     General: Skin is warm and dry. Findings: No rash. Neurological:      General: No focal deficit present. Mental Status: He is alert and oriented to person, place, and time. Cranial Nerves: No cranial nerve deficit. Sensory: No sensory deficit. Motor: No weakness. Gait: Gait normal.   Psychiatric:         Mood and Affect: Mood normal.         Behavior: Behavior normal.         Thought Content:  Thought content normal.         Judgment: Judgment normal.         Assessment:       Diagnosis Orders   1. Chronic obstructive pulmonary disease, unspecified COPD type ([facility]  tiotropium-olodaterol (STIOLTO RESPIMAT) 2.5-2.5 MCG/ACT AERS   2. Health care maintenance  Tetanus-Diphth-Acell Pertussis (BOOSTRIX) injection 0.5 mL         Plan:      No orders of the defined types were placed in this encounter. Orders Placed This Encounter   Medications    tiotropium-olodaterol (STIOLTO RESPIMAT) 2.5-2.5 MCG/ACT AERS     Sig: Inhale 2 puffs into the lungs daily     Dispense:  2 each     Refill:  0    Tetanus-Diphth-Acell Pertussis (BOOSTRIX) injection 0.5 mL       No follow-ups on file. Strongly encouraged patient to get COVID booster. I also encouraged him to get his pneumococcal vaccine, he plans to discuss this with his pulmonologist.  Advised patient to return to clinic sooner with increased shortness of breath or other concerning symptom. Go to the emergency room with chest pain, worsening shortness of breath, dizziness, or syncope.   Mary Lou Gardner MD

## 2022-06-02 NOTE — PATIENT INSTRUCTIONS
Patient Education        Chronic Obstructive Pulmonary Disease (COPD): Care Instructions  Overview     Chronic obstructive pulmonary disease (COPD) is a lung disease that makes it hard to breathe. With COPD, the airways that lead to the lungs are narrowed, and the tiny air sacs in the lungs are damaged and lose their stretch. People with COPD have decreased airflow in and out of the lungs, which makes it hard to breathe. The airways also can get clogged with thick mucus. Cigarettesmoking is a major cause of COPD. Although there is no cure for COPD, you can slow its progress. Following your treatment plan and taking care of yourself can help you feel better and livelonger. Follow-up care is a key part of your treatment and safety. Be sure to make and go to all appointments, and call your doctor if you are having problems. It's also a good idea to know your test results and keep alist of the medicines you take. How can you care for yourself at home? Staying healthy     Do not smoke. This is the most important step you can take to prevent more damage to your lungs. If you need help quitting, talk to your doctor about stop-smoking programs and medicines. These can increase your chances of quitting for good.      Avoid colds and other infections. Get the pneumococcal and whooping cough (pertussis) vaccines. If you have had these vaccines before, ask your doctor if you need another dose. Get the flu vaccine every fall. If you must be around people with colds or the flu, wash your hands often.      Avoid secondhand smoke and air pollution. Try to stay inside with your windows closed when air pollution is bad. Medicines and oxygen therapy     Take your medicines exactly as prescribed. Call your doctor if you think you are having a problem with your medicine. You may be taking medicines such as:  ? Bronchodilators. These help open your airways and make breathing easier.  They are either short-acting (work for 4 to 9 hours) or long-acting (work for 12 to 24 hours). You inhale most bronchodilators, so they start to act quickly. Always carry your quick-relief inhaler with you in case you need it. ? Corticosteroids (prednisone, budesonide). These reduce airway inflammation. They come in inhaled or pill form.      Ask your doctor or pharmacist if you are using each type of inhaler correctly. With correct use, the medicine is more likely to get to your lungs.      See if a spacer is right for you. A spacer may also help you get more inhaled medicine to your lungs. If you use one, ask how to use it properly.      Do not take any vitamins, over-the-counter medicine, or herbal products without talking to your doctor first.      If your doctor prescribed antibiotics, take them as directed. Do not stop taking them just because you feel better. You need to take the full course of antibiotics.      If you use oxygen therapy, use the flow rate your doctor has recommended. Don't change it without talking to your doctor first. Oxygen therapy boosts the amount of oxygen in your blood and helps you breathe easier. Activity     Get regular exercise. Walking is an easy way to get exercise. Start out slowly, and walk a little more each day.      Pay attention to your breathing. You are exercising too hard if you can't talk while you exercise.      Take short rest breaks when doing household chores and other activities.      Learn breathing methods--such as breathing through pursed lips--to help you become less short of breath.      If your doctor has not set you up with a pulmonary rehabilitation program, ask if rehab is right for you. Rehab includes exercise programs, education about your disease and how to manage it, help with diet and other changes, and emotional support.    Diet     Eat regular, healthy meals.      Use bronchodilators about 1 hour before you eat to make it easier to eat.      Eat several smaller, frequent meals to prevent getting too full. A full stomach can push on the muscle that helps you breathe (your diaphragm) and make it harder to breathe.      Drink beverages at the end of the meal.      Avoid foods that are hard to chew.      Eat foods that contain protein so you don't lose muscle mass.      Talk with your doctor if you gain too much weight or if you lose weight without trying. Mental health     Talk to your family, friends, or a therapist about your feelings. Some people feel frightened, angry, hopeless, helpless, and even guilty. Talking openly about feelings may help you cope. If these feelings last, talk to your doctor. When should you call for help? Call 911 anytime you think you may need emergency care. For example, call if:     You have severe trouble breathing.      You are having chest pain that is different or worse than usual.   Call your doctor now or seek immediate medical care if:     You have new or worse trouble breathing.      You cough up blood.      You have a fever.      You have feelings of anxiety or depression. Watch closely for changes in your health, and be sure to contact your doctor if:     You cough more deeply or more often, especially if you notice more mucus or a change in the color of your mucus.      You have new or worse swelling in your legs or belly.      You are not getting better as expected. Where can you learn more? Go to [email or web address]. org and sign in to your [employer] account. Enter M776 in the [facility] box to learn more about \"Chronic Obstructive Pulmonary Disease (COPD): Care Instructions. \"     If you do not have an account, please click on the \"Sign Up Now\" link. Current as of: July 6, 2021               Content Version: 13.2  © 0179-7457 Healthwise, Incorporated. Care instructions adapted under license by [facility] ([facility]).  If you have questions about a medical condition or this instruction, always ask your healthcare professional. [address] any warranty or liability for your use of this information.

## 2022-06-05 LAB
ALPHA-1 ANTITRYPSIN PHENOTYPE: NORMAL
ALPHA-1 ANTITRYPSIN: 148 MG/DL (ref 90–200)

## 2022-06-08 ENCOUNTER — TELEPHONE (OUTPATIENT)
Dept: INTERNAL MEDICINE | Age: 57
End: 2022-06-08

## 2022-06-16 ENCOUNTER — HOSPITAL ENCOUNTER (OUTPATIENT)
Dept: PULMONOLOGY | Age: 57
Discharge: HOME | End: 2022-06-16
Payer: COMMERCIAL

## 2022-06-16 DIAGNOSIS — J44.9 CHRONIC OBSTRUCTIVE PULMONARY DISEASE, UNSPECIFIED COPD TYPE (HCC): ICD-10-CM

## 2022-06-16 PROCEDURE — 94729 DIFFUSING CAPACITY: CPT

## 2022-06-16 PROCEDURE — 6360000002 HC RX W HCPCS: Performed by: INTERNAL MEDICINE

## 2022-06-16 PROCEDURE — 94010 BREATHING CAPACITY TEST: CPT

## 2022-06-16 PROCEDURE — 94726 PLETHYSMOGRAPHY LUNG VOLUMES: CPT

## 2022-06-16 RX ORDER — ALBUTEROL SULFATE 2.5 MG/3ML
2.5 SOLUTION RESPIRATORY (INHALATION) ONCE
Status: COMPLETED | OUTPATIENT
Start: 2022-06-16 | End: 2022-06-16

## 2022-06-16 RX ADMIN — ALBUTEROL SULFATE 2.5 MG: 2.5 SOLUTION RESPIRATORY (INHALATION) at 13:24

## 2022-06-17 PROCEDURE — 94729 DIFFUSING CAPACITY: CPT | Performed by: INTERNAL MEDICINE

## 2022-06-17 PROCEDURE — 94060 EVALUATION OF WHEEZING: CPT | Performed by: INTERNAL MEDICINE

## 2022-06-17 PROCEDURE — 94726 PLETHYSMOGRAPHY LUNG VOLUMES: CPT | Performed by: INTERNAL MEDICINE

## 2022-06-17 NOTE — PROCEDURES
[address] [address] [place] [address] Estela Daly, [address] [facility]                    PULMONARY FUNCTION  Marbin Selfper   60 y.o.   male  Height 71 in  Weight 191 lb      Referring provider   Naina Purvis MD    Reading provider   Prem Gallagher MD              Test interpretation:    The quality of the study is good. Very severe obstructive ventilatory defect with no significant response to bronchodilators. Lung volumes are suggestive of severe air trapping. There is moderate reduction in diffusion capacity.           Prem Gallagher MD , 6/17/2022 1:16 PM

## 2022-06-22 ENCOUNTER — TELEPHONE (OUTPATIENT)
Dept: INTERNAL MEDICINE | Age: 57
End: 2022-06-22

## 2022-06-22 DIAGNOSIS — Z00.00 ROUTINE GENERAL MEDICAL EXAMINATION AT HEALTH CARE FACILITY: Primary | ICD-10-CM

## 2022-06-22 DIAGNOSIS — E87.5 HYPERKALEMIA: ICD-10-CM

## 2022-06-22 DIAGNOSIS — R94.31 ABNORMAL EKG: ICD-10-CM

## 2022-06-22 DIAGNOSIS — J43.9 PULMONARY EMPHYSEMA, UNSPECIFIED EMPHYSEMA TYPE (HCC): ICD-10-CM

## 2022-06-22 NOTE — TELEPHONE ENCOUNTER
Spoke to pt, pt states that he is not able to see cardiologist through [facility] due to INS coverage. Pt states that he would like referral to [facility]. Order completed. Pt spoke about a CT that [facility] cardiologist ordered for him reg calcium levels I believe. Pt states that they were not able to perform test due to heart racing. Pt aware that you may not want to order and have cardiologist look into that.

## 2022-06-22 NOTE — TELEPHONE ENCOUNTER
----- Message from Queenie Carballo sent at 6/22/2022 12:07 PM EDT -----  Subject: Message to Provider    QUESTIONS  Information for Provider? pt has questions in regards to original referral   to cardiologist. please return call to pt.  ---------------------------------------------------------------------------  --------------  [address]  What is the best way for the office to contact you? OK to leave message on   voicemail  Preferred Call Back Phone Number?  [phone]  ---------------------------------------------------------------------------  --------------  SCRIPT ANSWERS  undefined

## 2022-06-23 NOTE — TELEPHONE ENCOUNTER
Pt aware.  Gave patient the following contact information:  [facility]- Rajesh Raoms MD   [address] [facility], [address] [address] [facility], [address]: [phone]   [address] [facility]

## 2022-07-01 ENCOUNTER — OFFICE VISIT (OUTPATIENT)
Dept: PULMONOLOGY | Age: 57
End: 2022-07-01
Payer: COMMERCIAL

## 2022-07-01 VITALS
TEMPERATURE: 97 F | HEIGHT: 72 IN | DIASTOLIC BLOOD PRESSURE: 84 MMHG | HEART RATE: 93 BPM | SYSTOLIC BLOOD PRESSURE: 138 MMHG | RESPIRATION RATE: 16 BRPM | BODY MASS INDEX: 25.65 KG/M2 | OXYGEN SATURATION: 96 % | WEIGHT: 189.4 LBS

## 2022-07-01 DIAGNOSIS — J44.9 CHRONIC OBSTRUCTIVE PULMONARY DISEASE, UNSPECIFIED COPD TYPE (HCC): Primary | ICD-10-CM

## 2022-07-01 DIAGNOSIS — Z87.891 HISTORY OF SMOKING: ICD-10-CM

## 2022-07-01 DIAGNOSIS — R91.1 LUNG NODULE: ICD-10-CM

## 2022-07-01 PROCEDURE — 3017F COLORECTAL CA SCREEN DOC REV: CPT | Performed by: INTERNAL MEDICINE

## 2022-07-01 PROCEDURE — 3023F SPIROM DOC REV: CPT | Performed by: INTERNAL MEDICINE

## 2022-07-01 PROCEDURE — G8427 DOCREV CUR MEDS BY ELIG CLIN: HCPCS | Performed by: INTERNAL MEDICINE

## 2022-07-01 PROCEDURE — 99214 OFFICE O/P EST MOD 30 MIN: CPT | Performed by: INTERNAL MEDICINE

## 2022-07-01 PROCEDURE — G8419 CALC BMI OUT NRM PARAM NOF/U: HCPCS | Performed by: INTERNAL MEDICINE

## 2022-07-01 PROCEDURE — 1036F TOBACCO NON-USER: CPT | Performed by: INTERNAL MEDICINE

## 2022-07-01 RX ORDER — FLUTICASONE FUROATE, UMECLIDINIUM BROMIDE AND VILANTEROL TRIFENATATE 100; 62.5; 25 UG/1; UG/1; UG/1
1 POWDER RESPIRATORY (INHALATION) DAILY
Qty: 2 EACH | Refills: 0 | COMMUNITY
Start: 2022-07-01 | End: 2022-07-20

## 2022-07-01 NOTE — PROGRESS NOTES
Never true   Transportation Needs:     Lack of Transportation (Medical): Not on file    Lack of Transportation (Non-Medical): Not on file   Physical Activity:     Days of Exercise per Week: Not on file    Minutes of Exercise per Session: Not on file   Stress:     Feeling of Stress : Not on file   Social Connections:     Frequency of Communication with Friends and Family: Not on file    Frequency of Social Gatherings with Friends and Family: Not on file    Attends [employer] Services: Not on file    Active Member of Clubs or Organizations: Not on file    Attends Club or Organization Meetings: Not on file    Marital Status: Not on file   Intimate Partner Violence:     Fear of Current or Ex-Partner: Not on file    Emotionally Abused: Not on file    Physically Abused: Not on file    Sexually Abused: Not on file   Housing Stability:     Unable to Pay for Housing in the Last Year: Not on file    Number of [place] in the Last Year: Not on file    Unstable Housing in the Last Year: Not on file         Review of Systems      ROS: 10 organs review of system is done including general, psychological, ENT, hematological, endocrine, respiratory, cardiovascular, gastrointestinal,musculoskeletal, neurological,  allergy and Immunology is done and is otherwise negative. Current Outpatient Medications   Medication Sig Dispense Refill    fluticasone-umeclidin-vilant (TRELEGY ELLIPTA) 100-62.5-25 MCG/INH AEPB Inhale 1 puff into the lungs daily 1 each 3    albuterol sulfate  (90 Base) MCG/ACT inhaler Inhale 2 puffs into the lungs every 6 hours as needed for Wheezing 1 each 3     No current facility-administered medications for this visit.        Objective:     Vitals:    07/01/22 1121 07/01/22 1131   BP: (!) 144/84 138/84   Site: Right Upper Arm Left Upper Arm   Position: Sitting Sitting   Cuff Size: Medium Adult Medium Adult   Pulse: 93    Resp: 16    Temp: 97 °F (36.1 °C)    TempSrc: Infrared    SpO2: 96%    Weight: 189 lb 6.4 oz (85.9 kg)    Height: 5' 11.5\" (1.816 m)          Physical Exam  Constitutional:       Appearance: He is well-developed. HENT:      Head: Normocephalic and atraumatic. Eyes:      General:         Left eye: No discharge. Conjunctiva/sclera: Conjunctivae normal.      Pupils: Pupils are equal, round, and reactive to light. Neck:      Vascular: No JVD. Cardiovascular:      Rate and Rhythm: Normal rate and regular rhythm. Heart sounds: Normal heart sounds. No murmur heard. No friction rub. No gallop. Pulmonary:      Effort: Pulmonary effort is normal. No respiratory distress. Breath sounds: Normal breath sounds. No wheezing or rales. Chest:      Chest wall: No tenderness. Abdominal:      General: Bowel sounds are normal.      Palpations: Abdomen is soft. Musculoskeletal:         General: No tenderness or deformity. Cervical back: Normal range of motion and neck supple. Right lower leg: No edema. Left lower leg: No edema. Skin:     General: Skin is warm and dry. Neurological:      Mental Status: He is alert and oriented to person, place, and time. Psychiatric:         Judgment: Judgment normal.         Imaging studies reviewed by me CT chest April 2022, reviewed with the patient, has 6 mm lung nodule in the right middle lobe, additional smaller lung nodule 4.8 mm  Lab results reviewed in chart  PFT June 2022, shows FEV1 29% with no significant response to bronchodilator, air trapping and hyperinflation,       Assessment and Plan       Diagnosis Orders   1. Chronic obstructive pulmonary disease, unspecified COPD type ([facility] [address]  fluticasone-umeclidin-vilant (TRELEGY ELLIPTA) 100-62.5-25 MCG/INH AEPB   2. Lung nodule  CT CHEST WO CONTRAST     Very severe COPD, will stop Stiolto, start Trelegy, continue as needed albuterol, yearly flu shot.   Patient will call and schedule nocturnal pulse oximetry check  Lung nodule, 6 mm in size, will repeat CT chest in 6 months      Orders Placed This Encounter   Procedures    CT CHEST WO CONTRAST     Standing Status:   Future     Standing Expiration Date:   7/1/2023     Orders Placed This Encounter   Medications    fluticasone-umeclidin-vilant (Raford Grew) 100-62.5-25 MCG/INH AEPB     Sig: Inhale 1 puff into the lungs daily     Dispense:  1 each     Refill:  3            Discussed with patient the importance of exercise and weight control and  overall health and well-being. Reviewed with the patient: current clinical status, medications, activities and diet. Side effects, adverse effects of the medication prescribed today, as well as treatment plan and result expectations have been discussed with the patient who expresses understanding and desires to proceed. Return in about 4 months (around 11/1/2022).       Maite Rodriguez MD

## 2022-07-20 ENCOUNTER — OFFICE VISIT (OUTPATIENT)
Dept: CARDIOLOGY CLINIC | Age: 57
End: 2022-07-20
Payer: COMMERCIAL

## 2022-07-20 VITALS
SYSTOLIC BLOOD PRESSURE: 138 MMHG | HEART RATE: 102 BPM | DIASTOLIC BLOOD PRESSURE: 86 MMHG | BODY MASS INDEX: 25.73 KG/M2 | WEIGHT: 190 LBS | HEIGHT: 72 IN | OXYGEN SATURATION: 96 %

## 2022-07-20 DIAGNOSIS — I10 HYPERTENSION, UNSPECIFIED TYPE: ICD-10-CM

## 2022-07-20 DIAGNOSIS — J42 CHRONIC BRONCHITIS, UNSPECIFIED CHRONIC BRONCHITIS TYPE (HCC): ICD-10-CM

## 2022-07-20 DIAGNOSIS — Z87.891 FORMER SMOKER: ICD-10-CM

## 2022-07-20 DIAGNOSIS — R06.09 DOE (DYSPNEA ON EXERTION): ICD-10-CM

## 2022-07-20 DIAGNOSIS — R07.9 CHEST PAIN, UNSPECIFIED TYPE: Primary | ICD-10-CM

## 2022-07-20 DIAGNOSIS — R09.89 BILATERAL CAROTID BRUITS: ICD-10-CM

## 2022-07-20 PROCEDURE — 99204 OFFICE O/P NEW MOD 45 MIN: CPT | Performed by: INTERNAL MEDICINE

## 2022-07-20 PROCEDURE — 1036F TOBACCO NON-USER: CPT | Performed by: INTERNAL MEDICINE

## 2022-07-20 PROCEDURE — 3017F COLORECTAL CA SCREEN DOC REV: CPT | Performed by: INTERNAL MEDICINE

## 2022-07-20 PROCEDURE — 3023F SPIROM DOC REV: CPT | Performed by: INTERNAL MEDICINE

## 2022-07-20 PROCEDURE — G8419 CALC BMI OUT NRM PARAM NOF/U: HCPCS | Performed by: INTERNAL MEDICINE

## 2022-07-20 PROCEDURE — G8427 DOCREV CUR MEDS BY ELIG CLIN: HCPCS | Performed by: INTERNAL MEDICINE

## 2022-07-20 ASSESSMENT — ENCOUNTER SYMPTOMS
NAUSEA: 0
COUGH: 0
WHEEZING: 0
SHORTNESS OF BREATH: 1
EYES NEGATIVE: 1
GASTROINTESTINAL NEGATIVE: 1
BLOOD IN STOOL: 0
CHEST TIGHTNESS: 0
STRIDOR: 0

## 2022-07-20 NOTE — PROGRESS NOTES
puffs into the lungs every 6 hours as needed for Wheezing 1 each 3     No current facility-administered medications for this visit. Review of Systems:   Review of Systems   Constitutional: Negative. Negative for diaphoresis and fatigue. HENT: Negative. Eyes: Negative. Respiratory:  Positive for shortness of breath. Negative for cough, chest tightness, wheezing and stridor. Cardiovascular: Negative. Negative for chest pain, palpitations and leg swelling. Gastrointestinal: Negative. Negative for blood in stool and nausea. Genitourinary: Negative. Musculoskeletal: Negative. Skin: Negative. Neurological: Negative. Negative for dizziness, syncope, weakness and light-headedness. Hematological: Negative. Psychiatric/Behavioral: Negative. Physical Examination:    /86 (Site: Right Upper Arm, Position: Sitting, Cuff Size: Medium Adult)   Pulse (!) 102   Ht 5' 11.5\" (1.816 m)   Wt 190 lb (86.2 kg)   SpO2 96%   BMI 26.13 kg/m²    Physical Exam   Constitutional: He appears healthy. No distress. HENT:   Normal cephalic and Atraumatic   Eyes: Pupils are equal, round, and reactive to light. Neck: Thyroid normal. No JVD present. No neck adenopathy. No thyromegaly present. Cardiovascular: Normal rate, regular rhythm, normal heart sounds, intact distal pulses and normal pulses. Pulmonary/Chest: Effort normal and breath sounds normal. He has no wheezes. He has no rales. He exhibits no tenderness. Abdominal: Soft. Bowel sounds are normal. There is no abdominal tenderness. Musculoskeletal:         General: No tenderness or edema. Normal range of motion. Cervical back: Normal range of motion and neck supple. Neurological: He is alert and oriented to person, place, and time. Skin: Skin is warm. No cyanosis. Nails show no clubbing.      LABS:  CBC:   Lab Results   Component Value Date/Time    WBC 7.3 04/14/2022 11:23 AM    RBC 5.32 04/14/2022 11:23 AM    HGB 15.8 04/14/2022 11:23 AM    HCT 46.0 04/14/2022 11:23 AM    MCV 86.4 04/14/2022 11:23 AM    MCH 29.6 04/14/2022 11:23 AM    MCHC 34.3 04/14/2022 11:23 AM    RDW 13.9 04/14/2022 11:23 AM     04/14/2022 11:23 AM     Lipids:  Lab Results   Component Value Date    CHOL 187 04/14/2022    CHOL 156 11/07/2012     Lab Results   Component Value Date    TRIG 88 04/14/2022    TRIG 58 11/07/2012     Lab Results   Component Value Date    HDL 41 04/14/2022    HDL 43 11/07/2012     Lab Results   Component Value Date    LDLCALC 128 04/14/2022    LDLCALC 101 11/07/2012     No results found for: LABVLDL, VLDL  Lab Results   Component Value Date    CHOLHDLRATIO 3.6 11/07/2012     CMP:    Lab Results   Component Value Date/Time     05/05/2022 09:30 AM    K 4.8 05/05/2022 09:30 AM    CL 97 05/05/2022 09:30 AM    CO2 24 05/05/2022 09:30 AM    BUN 12 05/05/2022 09:30 AM    CREATININE 0.92 05/05/2022 09:30 AM    GFRAA >60.0 05/05/2022 09:30 AM    LABGLOM >60.0 05/05/2022 09:30 AM    GLUCOSE 100 05/05/2022 09:30 AM    PROT 7.6 04/14/2022 11:23 AM    LABALBU 4.5 04/14/2022 11:23 AM    CALCIUM 9.4 05/05/2022 09:30 AM    BILITOT 0.4 04/14/2022 11:23 AM    ALKPHOS 83 04/14/2022 11:23 AM    AST <5 04/14/2022 11:23 AM    ALT <5 04/14/2022 11:23 AM     BMP:    Lab Results   Component Value Date/Time     05/05/2022 09:30 AM    K 4.8 05/05/2022 09:30 AM    CL 97 05/05/2022 09:30 AM    CO2 24 05/05/2022 09:30 AM    BUN 12 05/05/2022 09:30 AM    LABALBU 4.5 04/14/2022 11:23 AM    CREATININE 0.92 05/05/2022 09:30 AM    CALCIUM 9.4 05/05/2022 09:30 AM    GFRAA >60.0 05/05/2022 09:30 AM    LABGLOM >60.0 05/05/2022 09:30 AM    GLUCOSE 100 05/05/2022 09:30 AM     Magnesium:  No results found for: MG  TSH:No results found for: TSHFT4, TSH          There is no problem list on file for this patient.       Medications Discontinued During This Encounter   Medication Reason    fluticasone-umeclidin-vilant (Olimpia Ochoa) 100-62.5-25 MCG/INH AEPB LIST CLEANUP       Modified Medications    No medications on file       No orders of the defined types were placed in this encounter. Assessment/Plan:    1. Chest pain, unspecified type       2. KNIGHT (dyspnea on exertion)     - NM MYOCARDIAL SPECT REST EXERCISE OR RX; Future  - Echo 2D w doppler w color complete; Future    3. Chronic bronchitis, unspecified chronic bronchitis type ([facility] [address]       4. Hypertension, unspecified type   Will address after tests. Low salt diet   - US SCREENING FOR AAA; Future    5. Bilateral carotid bruits     - US CAROTID ARTERY BILATERAL; Future    6. Former smoker     - [place] AAA; Future     Counseling:  Heart Healthy Lifestyle, Low Salt Diet, Take Precautions to Prevent Falls, and Walk Daily    Return for AFTER TESTS.       Electronically signed by Prerna Cavanaugh MD on 7/20/2022 at 12:48 PM

## 2022-08-02 DIAGNOSIS — J44.9 CHRONIC OBSTRUCTIVE PULMONARY DISEASE, UNSPECIFIED COPD TYPE (HCC): ICD-10-CM

## 2022-08-09 ENCOUNTER — TELEPHONE (OUTPATIENT)
Dept: PULMONOLOGY | Age: 57
End: 2022-08-09

## 2022-08-09 RX ORDER — FLUTICASONE FUROATE, UMECLIDINIUM BROMIDE AND VILANTEROL TRIFENATATE 100; 62.5; 25 UG/1; UG/1; UG/1
1 POWDER RESPIRATORY (INHALATION) DAILY
Qty: 2 EACH | Refills: 0 | COMMUNITY
Start: 2022-08-09 | End: 2022-09-12 | Stop reason: SDUPTHER

## 2022-08-09 NOTE — TELEPHONE ENCOUNTER
Please find out what alternative covered by his insurance at a reasonable price, thanks for samples, please let him know he can get samples as long as we have it available until we can find him reasonable alternative.     Please ask  patient to call the pharmacy or his insurance company and let me know what would they cover at a better price

## 2022-08-09 NOTE — TELEPHONE ENCOUNTER
PT CALLED IN TO THE OFFICE BECAUSE HIS TRELEGY  A MONTH AND HE CANT AFFORD IT. I DID GIVE PT SAMPLES FOR THE MEANTIME.         PLEASE ADVISE

## 2022-08-15 ENCOUNTER — TELEPHONE (OUTPATIENT)
Dept: CARDIOLOGY CLINIC | Age: 57
End: 2022-08-15

## 2022-08-15 DIAGNOSIS — R06.02 SOB (SHORTNESS OF BREATH): Primary | ICD-10-CM

## 2022-08-15 NOTE — TELEPHONE ENCOUNTER
Patient states he called last week (does not know who he spoke with) to let Dr. Tc Steele know the insurance will not cover the stress test (due to his COPD)-the office was to let him to know how to proceed (he will call scheduling to cancel) please call to discuss

## 2022-08-16 LAB — NONINV COLON CA DNA+OCC BLD SCRN STL QL: NEGATIVE

## 2022-08-19 ENCOUNTER — HOSPITAL ENCOUNTER (OUTPATIENT)
Dept: ULTRASOUND IMAGING | Age: 57
Discharge: HOME | End: 2022-08-21
Payer: COMMERCIAL

## 2022-08-19 DIAGNOSIS — Z87.891 FORMER SMOKER: ICD-10-CM

## 2022-08-19 DIAGNOSIS — I10 HYPERTENSION, UNSPECIFIED TYPE: ICD-10-CM

## 2022-08-19 DIAGNOSIS — R09.89 BILATERAL CAROTID BRUITS: ICD-10-CM

## 2022-08-19 PROCEDURE — 93880 EXTRACRANIAL BILAT STUDY: CPT

## 2022-08-19 PROCEDURE — 76706 US ABDL AORTA SCREEN AAA: CPT | Performed by: INTERNAL MEDICINE

## 2022-08-19 PROCEDURE — 93880 EXTRACRANIAL BILAT STUDY: CPT | Performed by: INTERNAL MEDICINE

## 2022-08-19 PROCEDURE — 76706 US ABDL AORTA SCREEN AAA: CPT

## 2022-08-24 ENCOUNTER — HOSPITAL ENCOUNTER (OUTPATIENT)
Dept: NON INVASIVE DIAGNOSTICS | Age: 57
Discharge: HOME | End: 2022-08-24
Payer: COMMERCIAL

## 2022-08-24 DIAGNOSIS — R06.09 DOE (DYSPNEA ON EXERTION): ICD-10-CM

## 2022-08-24 LAB
LV EF: 60 %
LVEF MODALITY: NORMAL

## 2022-08-24 PROCEDURE — 93306 TTE W/DOPPLER COMPLETE: CPT

## 2022-08-24 PROCEDURE — 93017 CV STRESS TEST TRACING ONLY: CPT

## 2022-08-24 NOTE — PROGRESS NOTES
Hx,allergies and medications reviewed. Patient held home medications prior to testing. Procedure explained and informed consent obtained. Tolerated treadmill well for 3.47 minutes. Reached 85 % target HR. SOB noted. Returned to baseline in recovery. Denies chest pain or pressure. EKG shows some ectopy. Doctor to further review and interpret results.     Electronically signed by Radha Moore RN on 8/24/2022 at 12:43 PM

## 2022-08-25 NOTE — PROCEDURES
[address]                      [address], [address] [facility]                              CARDIAC STRESS TEST    PATIENT NAME: Shilo Boykin                  :        1965  MED REC NO:   [id number]                            ROOM:  ACCOUNT NO:   [de-identified]                           ADMIT DATE: 2022  PROVIDER:     Asya Hinton MD    CARDIOVASCULAR DIAGNOSTIC DEPARTMENT    DATE OF STUDY:  2022    TREADMILL EXERCISE TEST REPORT    ORDERING PROVIDER:  Yobany Odonnell MD    INDICATION:  Shortness of breath. DESCRIPTION OF PROCEDURE:  The patient was stressed according to the  Santosh protocol for 3 minutes and 47 seconds. METS:  6.4. RESTING HEART RATE:  113 beats per minute. MAXIMUM HEART RATE:  139 beats per minute. The patient achieved 85% of the maximum age predicted heart rate. Resting blood pressure 162/85, eh to 198/92. Electrocardiogram at rest revealed sinus tachycardia at a rate of 107  beats per minute. With exercise, there were no significant  electrocardiographic changes consistent with ischemia, random PVCs,  which normalized at the end of the study. The patient stopped due to  fatigue and shortness of breath and did not have exercise-induced chest  pain. IMPRESSION:  1. Below average exercise capacity for age and gender. 2.  Normal blood pressure response to exercise.   3.  No clinical or electrocardiographic changes consistent with  myocardial ischemia noted during this exercise stress test.        Faith Hooper MD    D: 2022 #13:58:54       T: 2022 14:02:22     [email or web address]  Job#: [id number]     Doc#: [id number]    CC:

## 2022-08-26 PROCEDURE — 93018 CV STRESS TEST I&R ONLY: CPT | Performed by: INTERNAL MEDICINE

## 2022-09-12 RX ORDER — FLUTICASONE FUROATE, UMECLIDINIUM BROMIDE AND VILANTEROL TRIFENATATE 100; 62.5; 25 UG/1; UG/1; UG/1
1 POWDER RESPIRATORY (INHALATION) DAILY
Qty: 2 EACH | Refills: 0 | COMMUNITY
Start: 2022-09-12

## 2022-09-12 NOTE — TELEPHONE ENCOUNTER
SAMPLES OF TRELEGY 100 GIVEN TO PATIENT BY JN CARVAJALY: 2  LOT# [id number]  EXP 2/2024      PLEASE SIGN OFF ON SAMPLES

## 2022-11-22 ENCOUNTER — OFFICE VISIT (OUTPATIENT)
Dept: INTERNAL MEDICINE | Age: 57
End: 2022-11-22
Payer: COMMERCIAL

## 2022-11-22 VITALS
OXYGEN SATURATION: 99 % | TEMPERATURE: 97.8 F | WEIGHT: 178 LBS | BODY MASS INDEX: 24.11 KG/M2 | SYSTOLIC BLOOD PRESSURE: 128 MMHG | HEIGHT: 72 IN | DIASTOLIC BLOOD PRESSURE: 80 MMHG | HEART RATE: 95 BPM

## 2022-11-22 DIAGNOSIS — Z00.00 PHYSICAL EXAM: ICD-10-CM

## 2022-11-22 DIAGNOSIS — J43.9 PULMONARY EMPHYSEMA, UNSPECIFIED EMPHYSEMA TYPE (HCC): Primary | ICD-10-CM

## 2022-11-22 DIAGNOSIS — L98.9 SKIN LESION OF FACE: ICD-10-CM

## 2022-11-22 DIAGNOSIS — L98.9 SKIN LESION OF CHEST WALL: ICD-10-CM

## 2022-11-22 LAB
CHOLESTEROL, TOTAL: 195 MG/DL (ref 0–199)
HBA1C MFR BLD: 5.5 %
HDLC SERPL-MCNC: 44 MG/DL (ref 40–59)
LDL CHOLESTEROL CALCULATED: 136 MG/DL (ref 0–129)
TRIGL SERPL-MCNC: 76 MG/DL (ref 0–150)

## 2022-11-22 PROCEDURE — 99213 OFFICE O/P EST LOW 20 MIN: CPT | Performed by: FAMILY MEDICINE

## 2022-11-22 PROCEDURE — G8420 CALC BMI NORM PARAMETERS: HCPCS | Performed by: FAMILY MEDICINE

## 2022-11-22 PROCEDURE — 3017F COLORECTAL CA SCREEN DOC REV: CPT | Performed by: FAMILY MEDICINE

## 2022-11-22 PROCEDURE — G8484 FLU IMMUNIZE NO ADMIN: HCPCS | Performed by: FAMILY MEDICINE

## 2022-11-22 PROCEDURE — 83036 HEMOGLOBIN GLYCOSYLATED A1C: CPT | Performed by: FAMILY MEDICINE

## 2022-11-22 PROCEDURE — G8427 DOCREV CUR MEDS BY ELIG CLIN: HCPCS | Performed by: FAMILY MEDICINE

## 2022-11-22 PROCEDURE — 3023F SPIROM DOC REV: CPT | Performed by: FAMILY MEDICINE

## 2022-11-22 PROCEDURE — 1036F TOBACCO NON-USER: CPT | Performed by: FAMILY MEDICINE

## 2022-11-25 ENCOUNTER — HOSPITAL ENCOUNTER (OUTPATIENT)
Dept: CT IMAGING | Age: 57
Discharge: HOME | End: 2022-11-27
Payer: COMMERCIAL

## 2022-11-25 DIAGNOSIS — R91.1 LUNG NODULE: ICD-10-CM

## 2022-11-25 PROCEDURE — 71250 CT THORAX DX C-: CPT

## 2022-11-27 ASSESSMENT — ENCOUNTER SYMPTOMS
GASTROINTESTINAL NEGATIVE: 1
EYES NEGATIVE: 1
SHORTNESS OF BREATH: 1

## 2022-11-28 NOTE — PROGRESS NOTES
Subjective:      Patient ID: Shahid Pickering is a 59 y.o. male who presents today for:  Chief Complaint   Patient presents with    Annual Exam     Annual exam, pt would like labs       HPI Pt is without complaint. He is feeling much better and plans to follow-up with pulmonology. Breathing is improved. No chest pain or dizziness. Lesion on left cheek has been present for years. Lesion on trunk has been present for years also. No fever or chills. No longer smoking. History reviewed. No pertinent past medical history.   Past Surgical History:   Procedure Laterality Date    APPENDECTOMY      EYE SURGERY      TONSILLECTOMY      UPPER GASTROINTESTINAL ENDOSCOPY  11/30/12    DR Sarmad Medrano     Social History     Socioeconomic History    Marital status:      Spouse name: Not on file    Number of children: Not on file    Years of education: Not on file    Highest education level: Not on file   Occupational History    Not on file   Tobacco Use    Smoking status: Former     Packs/day: 1.00     Years: 40.00     Pack years: 40.00     Types: Cigarettes     Start date: 16     Quit date: 2019     Years since quitting: 3.9    Smokeless tobacco: Never   Vaping Use    Vaping Use: Former    Substances: Nicotine   Substance and Sexual Activity    Alcohol use: Not Currently    Drug use: No    Sexual activity: Not on file   Other Topics Concern    Not on file   Social History Narrative    Not on file     Social Determinants of Health     Financial Resource Strain: Low Risk     Difficulty of Paying Living Expenses: Not hard at all   Food Insecurity: No Food Insecurity    Worried About Running Out of Food in the Last Year: Never true    Ran Out of Food in the Last Year: Never true   Transportation Needs: Not on file   Physical Activity: Not on file   Stress: Not on file   Social Connections: Not on file   Intimate Partner Violence: Not on file   Housing Stability: Not on file     Family History   Problem Relation Age of Onset    Other Mother     Diabetes Father     Atrial Fibrillation Brother     Heart Attack Paternal Grandfather      No Known Allergies      Review of Systems   Constitutional: Negative. HENT: Negative. Eyes: Negative. Respiratory:  Positive for shortness of breath (mild and improved). Cardiovascular: Negative. Gastrointestinal: Negative. Endocrine: Negative. Genitourinary: Negative. Musculoskeletal: Negative. Neurological: Negative. Psychiatric/Behavioral: Negative. Objective:   /80 (Site: Right Upper Arm, Position: Sitting, Cuff Size: Medium Adult)   Pulse 95   Temp 97.8 °F (36.6 °C) (Infrared)   Ht 5' 11.5\" (1.816 m)   Wt 178 lb (80.7 kg)   SpO2 99%   BMI 24.48 kg/m²     Physical Exam  Vitals reviewed. Constitutional:       General: He is not in acute distress. Appearance: He is not toxic-appearing. HENT:      Head: Normocephalic. Cardiovascular:      Rate and Rhythm: Normal rate and regular rhythm. Heart sounds: Normal heart sounds. No murmur heard. Pulmonary:      Breath sounds: Normal breath sounds. Abdominal:      General: Abdomen is flat. Palpations: Abdomen is soft. Musculoskeletal:         General: Normal range of motion. Cervical back: Normal range of motion and neck supple. Skin:     General: Skin is warm and dry. Capillary Refill: Capillary refill takes less than 2 seconds. Findings: Lesion (left cheek- dry scaly lesion, abdomen with pigmented irregular mole) present. Neurological:      General: No focal deficit present. Mental Status: He is alert and oriented to person, place, and time. Psychiatric:         Mood and Affect: Mood normal.         Behavior: Behavior normal.         Thought Content: Thought content normal.         Judgment: Judgment normal.       Assessment:       Diagnosis Orders   1. Pulmonary emphysema, unspecified emphysema type ([address]        2.  Skin lesion of face  JORGE Franco Gabriela Blank MD, Dermatoloty, [address] [facility]      3. Skin lesion of chest wall  [facility] - Sonya Pimentel MD, Dermatoloty, [facility] & [facility]      4. Physical exam  Lipid Panel    POCT glycosylated hemoglobin (Hb A1C)            Plan:      Orders Placed This Encounter   Procedures    Lipid Panel     Standing Status:   Future     Number of Occurrences:   1     Standing Expiration Date:   11/22/2023     Order Specific Question:   Is Patient Fasting?/# of Hours     Answer:   10    Lise Baldwin MD, Dermatoloty, [facility]     Referral Priority:   Routine     Referral Type:   Eval and Treat     Referral Reason:   Specialty Services Required     Referred to Provider:   Sharry Schaumann, MD     Requested Specialty:   Dermatology     Number of Visits Requested:   1    POCT glycosylated hemoglobin (Hb A1C)     No orders of the defined types were placed in this encounter. Return in about 3 months (around 2/22/2023) for symptom review. Strongly encouraged to see dermatology regarding abnormal appearing pigmented lesion- discussed risk of melanoma. Encouraged pt to see pulmonology and continue current meds. Pt has stopped smoking- encouraged him to continue not smoking.       Long Turcios MD

## 2022-12-01 ENCOUNTER — OFFICE VISIT (OUTPATIENT)
Dept: PULMONOLOGY | Age: 57
End: 2022-12-01
Payer: COMMERCIAL

## 2022-12-01 VITALS
OXYGEN SATURATION: 99 % | DIASTOLIC BLOOD PRESSURE: 82 MMHG | TEMPERATURE: 97 F | HEART RATE: 74 BPM | RESPIRATION RATE: 16 BRPM | WEIGHT: 179.8 LBS | SYSTOLIC BLOOD PRESSURE: 126 MMHG | HEIGHT: 72 IN | BODY MASS INDEX: 24.35 KG/M2

## 2022-12-01 DIAGNOSIS — I51.89 DIASTOLIC DYSFUNCTION: ICD-10-CM

## 2022-12-01 DIAGNOSIS — R91.1 LUNG NODULE: ICD-10-CM

## 2022-12-01 DIAGNOSIS — J44.9 CHRONIC OBSTRUCTIVE PULMONARY DISEASE, UNSPECIFIED COPD TYPE (HCC): Primary | ICD-10-CM

## 2022-12-01 PROCEDURE — G8484 FLU IMMUNIZE NO ADMIN: HCPCS | Performed by: INTERNAL MEDICINE

## 2022-12-01 PROCEDURE — 3017F COLORECTAL CA SCREEN DOC REV: CPT | Performed by: INTERNAL MEDICINE

## 2022-12-01 PROCEDURE — 3023F SPIROM DOC REV: CPT | Performed by: INTERNAL MEDICINE

## 2022-12-01 PROCEDURE — 99213 OFFICE O/P EST LOW 20 MIN: CPT | Performed by: INTERNAL MEDICINE

## 2022-12-01 PROCEDURE — G8420 CALC BMI NORM PARAMETERS: HCPCS | Performed by: INTERNAL MEDICINE

## 2022-12-01 PROCEDURE — G8427 DOCREV CUR MEDS BY ELIG CLIN: HCPCS | Performed by: INTERNAL MEDICINE

## 2022-12-01 PROCEDURE — 1036F TOBACCO NON-USER: CPT | Performed by: INTERNAL MEDICINE

## 2022-12-01 NOTE — PROGRESS NOTES
Subjective:     France Richardson is a 59 y.o. male who complains today of:     Chief Complaint   Patient presents with    Follow-up     4 Month F/U for COPD and Lung Nodule    Results     CT Scan of Chest       HPI  Patient presents for COPD    Doing good, symptoms controlled, no chest pain, no shortness of breath, active able to do his daily activities with no issues, minimal coughing, significantly improved after starting Trelegy, compliant with his treatment, he is on O2 2 L/min while asleep, and compliant, no fever no chills, no lower extremity edema, no heartburn, no nasal congestion or postnasal drip        Allergies:  Patient has no known allergies. History reviewed. No pertinent past medical history.   Past Surgical History:   Procedure Laterality Date    APPENDECTOMY      EYE SURGERY      TONSILLECTOMY      UPPER GASTROINTESTINAL ENDOSCOPY  11/30/12    DR Angelica Wood     Family History   Problem Relation Age of Onset    Other Mother     Diabetes Father     Atrial Fibrillation Brother     Heart Attack Paternal Grandfather      Social History     Socioeconomic History    Marital status:      Spouse name: Not on file    Number of children: Not on file    Years of education: Not on file    Highest education level: Not on file   Occupational History    Not on file   Tobacco Use    Smoking status: Former     Packs/day: 1.00     Years: 40.00     Pack years: 40.00     Types: Cigarettes     Start date: 16     Quit date: 2019     Years since quitting: 3.9    Smokeless tobacco: Never   Vaping Use    Vaping Use: Former    Substances: Nicotine   Substance and Sexual Activity    Alcohol use: Not Currently    Drug use: No    Sexual activity: Not on file   Other Topics Concern    Not on file   Social History Narrative    Not on file     Social Determinants of Health     Financial Resource Strain: Low Risk     Difficulty of Paying Living Expenses: Not hard at all   Food Insecurity: No Food Insecurity    Worried About Running Out of Food in the Last Year: Never true    Ran Out of Food in the Last Year: Never true   Transportation Needs: Not on file   Physical Activity: Not on file   Stress: Not on file   Social Connections: Not on file   Intimate Partner Violence: Not on file   Housing Stability: Not on file         Review of Systems      ROS: 10 organs review of system is done including general, psychological, ENT, hematological, endocrine, respiratory, cardiovascular, gastrointestinal,musculoskeletal, neurological,  allergy and Immunology is done and is otherwise negative. Current Outpatient Medications   Medication Sig Dispense Refill    fluticasone-umeclidin-vilant (TRELEGY ELLIPTA) 100-62.5-25 MCG/INH AEPB Inhale 1 puff into the lungs daily 1 each 3    albuterol sulfate  (90 Base) MCG/ACT inhaler Inhale 2 puffs into the lungs every 6 hours as needed for Wheezing 1 each 3     No current facility-administered medications for this visit. Objective:     Vitals:    12/01/22 0847   BP: 126/82   Site: Right Upper Arm   Position: Sitting   Cuff Size: Medium Adult   Pulse: 74   Resp: 16   Temp: 97 °F (36.1 °C)   TempSrc: Infrared   SpO2: 99%   Weight: 179 lb 12.8 oz (81.6 kg)   Height: 5' 11.5\" (1.816 m)         Physical Exam  Constitutional:       Appearance: He is well-developed. HENT:      Head: Normocephalic and atraumatic. Eyes:      General:         Left eye: No discharge. Conjunctiva/sclera: Conjunctivae normal.      Pupils: Pupils are equal, round, and reactive to light. Neck:      Vascular: No JVD. Cardiovascular:      Rate and Rhythm: Normal rate and regular rhythm. Heart sounds: Normal heart sounds. No murmur heard. No friction rub. No gallop. Pulmonary:      Effort: Pulmonary effort is normal. No respiratory distress. Breath sounds: Normal breath sounds. No wheezing or rales. Chest:      Chest wall: No tenderness.    Abdominal:      General: Bowel sounds are normal. Palpations: Abdomen is soft. Musculoskeletal:         General: No tenderness or deformity. Cervical back: Normal range of motion and neck supple. Right lower leg: No edema. Left lower leg: No edema. Skin:     General: Skin is warm and dry. Neurological:      Mental Status: He is alert and oriented to person, place, and time. Psychiatric:         Judgment: Judgment normal.       Imaging studies reviewed and interpreted by me CT chest November 2022, 5 mm lung nodule, stable, emphysema noted  Lab results reviewed in chart  PFT June 2022, FEV1 29% FEV1/FVC 0.41  ECHO: August 2022, EF 88% with diastolic dysfunction  Nocturnal pulse oximetry check, shows desaturation below 89% for 5 minutes  Assessment and Plan       Diagnosis Orders   1. Chronic obstructive pulmonary disease, unspecified COPD type ([facility] [address]        2. Lung nodule        3. Diastolic dysfunction          Continue Trelegy  As needed albuterol  Flu vaccine, COVID-19 and pneumonia vaccine  Lung nodule is stable, will plan to continue CT lung cancer screening on yearly basis  O2 2 L/min while asleep  Avoid volume overload      No orders of the defined types were placed in this encounter. No orders of the defined types were placed in this encounter. Discussed with patient the importance of exercise and weight control and  overall health and well-being. Reviewed with the patient: current clinical status, medications, activities and diet. Side effects, adverse effects of the medication prescribed today, as well as treatment plan and result expectations have been discussed with the patient who expresses understanding and desires to proceed. Return in about 6 months (around 6/1/2023).       Eliceo Bush MD

## 2023-01-13 ENCOUNTER — OFFICE VISIT (OUTPATIENT)
Dept: CARDIOLOGY CLINIC | Age: 58
End: 2023-01-13
Payer: COMMERCIAL

## 2023-01-13 VITALS
BODY MASS INDEX: 24.81 KG/M2 | DIASTOLIC BLOOD PRESSURE: 82 MMHG | OXYGEN SATURATION: 97 % | SYSTOLIC BLOOD PRESSURE: 130 MMHG | WEIGHT: 180.4 LBS | HEART RATE: 97 BPM

## 2023-01-13 DIAGNOSIS — R09.89 BILATERAL CAROTID BRUITS: ICD-10-CM

## 2023-01-13 DIAGNOSIS — Z87.891 FORMER SMOKER: ICD-10-CM

## 2023-01-13 DIAGNOSIS — J42 CHRONIC BRONCHITIS, UNSPECIFIED CHRONIC BRONCHITIS TYPE (HCC): ICD-10-CM

## 2023-01-13 DIAGNOSIS — I10 HYPERTENSION, UNSPECIFIED TYPE: ICD-10-CM

## 2023-01-13 DIAGNOSIS — R06.02 SOB (SHORTNESS OF BREATH): Primary | ICD-10-CM

## 2023-01-13 PROCEDURE — 99214 OFFICE O/P EST MOD 30 MIN: CPT | Performed by: INTERNAL MEDICINE

## 2023-01-13 PROCEDURE — 3023F SPIROM DOC REV: CPT | Performed by: INTERNAL MEDICINE

## 2023-01-13 PROCEDURE — 3075F SYST BP GE 130 - 139MM HG: CPT | Performed by: INTERNAL MEDICINE

## 2023-01-13 PROCEDURE — G8420 CALC BMI NORM PARAMETERS: HCPCS | Performed by: INTERNAL MEDICINE

## 2023-01-13 PROCEDURE — G8484 FLU IMMUNIZE NO ADMIN: HCPCS | Performed by: INTERNAL MEDICINE

## 2023-01-13 PROCEDURE — 3017F COLORECTAL CA SCREEN DOC REV: CPT | Performed by: INTERNAL MEDICINE

## 2023-01-13 PROCEDURE — G8427 DOCREV CUR MEDS BY ELIG CLIN: HCPCS | Performed by: INTERNAL MEDICINE

## 2023-01-13 PROCEDURE — 1036F TOBACCO NON-USER: CPT | Performed by: INTERNAL MEDICINE

## 2023-01-13 PROCEDURE — 3079F DIAST BP 80-89 MM HG: CPT | Performed by: INTERNAL MEDICINE

## 2023-01-13 ASSESSMENT — ENCOUNTER SYMPTOMS
GASTROINTESTINAL NEGATIVE: 1
NAUSEA: 0
BLOOD IN STOOL: 0
COUGH: 0
EYES NEGATIVE: 1
WHEEZING: 0
CHEST TIGHTNESS: 0
STRIDOR: 0
SHORTNESS OF BREATH: 1

## 2023-01-13 NOTE — PROGRESS NOTES
NEW PATIENT        Patient: Derick Trivedi  YOB: 1965  MRN: [id number]    Chief Complaint: KNIGHT COPD  Chief Complaint   Patient presents with    Results     Stress/ECHO       CV Data:   CUS mild    Abd US - no AAA   SPECT negative    Echo EF 60    Subjective/HPI  referreed for KNIGHT. Saw [address] once but Insurance does not cover them and therfore here. No prioor LHC nor stress. 21 doing better less SOB no cp no falls no bleed  takes meds. EKG: SR 98    Former smoker -   No etoh  Lives alone  +FH  Work - now a . No past medical history on file.     Past Surgical History:   Procedure Laterality Date    APPENDECTOMY      EYE SURGERY      TONSILLECTOMY      UPPER GASTROINTESTINAL ENDOSCOPY  12    DR Elaine Jackson       Family History   Problem Relation Age of Onset    Other Mother     Diabetes Father     Atrial Fibrillation Brother     Heart Attack Paternal Grandfather        Social History     Socioeconomic History    Marital status:      Spouse name: None    Number of children: None    Years of education: None    Highest education level: None   Tobacco Use    Smoking status: Former     Packs/day: 1.00     Years: 40.00     Pack years: 40.00     Types: Cigarettes     Start date:      Quit date: 2019     Years since quittin.0    Smokeless tobacco: Never   Vaping Use    Vaping Use: Former    Substances: Nicotine   Substance and Sexual Activity    Alcohol use: Not Currently    Drug use: No     Social Determinants of Health     Financial Resource Strain: Low Risk     Difficulty of Paying Living Expenses: Not hard at all   Food Insecurity: No Food Insecurity    Worried About Running Out of Food in the Last Year: Never true    Ran Out of Food in the Last Year: Never true       No Known Allergies    Current Outpatient Medications   Medication Sig Dispense Refill    fluticasone-umeclidin-vilant (TRELEGY ELLIPTA) 100-62.5-25 MCG/INH AEPB Inhale 1 puff into the lungs daily 1 each 3    albuterol sulfate  (90 Base) MCG/ACT inhaler Inhale 2 puffs into the lungs every 6 hours as needed for Wheezing 1 each 3     No current facility-administered medications for this visit. Review of Systems:   Review of Systems   Constitutional: Negative. Negative for diaphoresis and fatigue. HENT: Negative. Eyes: Negative. Respiratory:  Positive for shortness of breath. Negative for cough, chest tightness, wheezing and stridor. Cardiovascular: Negative. Negative for chest pain, palpitations and leg swelling. Gastrointestinal: Negative. Negative for blood in stool and nausea. Genitourinary: Negative. Musculoskeletal: Negative. Skin: Negative. Neurological: Negative. Negative for dizziness, syncope, weakness and light-headedness. Hematological: Negative. Psychiatric/Behavioral: Negative. Physical Examination:    /82 (Site: Right Upper Arm, Position: Sitting, Cuff Size: Medium Adult)   Pulse 97   Wt 180 lb 6.4 oz (81.8 kg)   SpO2 97%   BMI 24.81 kg/m²    Physical Exam   Constitutional: He appears healthy. No distress. HENT:   Normal cephalic and Atraumatic   Eyes: Pupils are equal, round, and reactive to light. Neck: Thyroid normal. No JVD present. No neck adenopathy. No thyromegaly present. Cardiovascular: Normal rate, regular rhythm, normal heart sounds, intact distal pulses and normal pulses. Pulmonary/Chest: Effort normal and breath sounds normal. He has no wheezes. He has no rales. He exhibits no tenderness. Abdominal: Soft. Bowel sounds are normal. There is no abdominal tenderness. Musculoskeletal:         General: No tenderness or edema. Normal range of motion. Cervical back: Normal range of motion and neck supple. Neurological: He is alert and oriented to person, place, and time. Skin: Skin is warm. No cyanosis. Nails show no clubbing.      LABS:  CBC:   Lab Results   Component Value Date/Time    WBC 7.3 04/14/2022 11:23 AM    RBC 5.32 04/14/2022 11:23 AM    HGB 15.8 04/14/2022 11:23 AM    HCT 46.0 04/14/2022 11:23 AM    MCV 86.4 04/14/2022 11:23 AM    MCH 29.6 04/14/2022 11:23 AM    MCHC 34.3 04/14/2022 11:23 AM    RDW 13.9 04/14/2022 11:23 AM     04/14/2022 11:23 AM     Lipids:  Lab Results   Component Value Date    CHOL 195 11/22/2022    CHOL 187 04/14/2022    CHOL 156 11/07/2012     Lab Results   Component Value Date    TRIG 76 11/22/2022    TRIG 88 04/14/2022    TRIG 58 11/07/2012     Lab Results   Component Value Date    HDL 44 11/22/2022    HDL 41 04/14/2022    HDL 43 11/07/2012     Lab Results   Component Value Date    LDLCALC 136 (H) 11/22/2022    LDLCALC 128 04/14/2022    LDLCALC 101 11/07/2012     No results found for: LABVLDL, VLDL  Lab Results   Component Value Date    CHOLHDLRATIO 3.6 11/07/2012     CMP:    Lab Results   Component Value Date/Time     05/05/2022 09:30 AM    K 4.8 05/05/2022 09:30 AM    CL 97 05/05/2022 09:30 AM    CO2 24 05/05/2022 09:30 AM    BUN 12 05/05/2022 09:30 AM    CREATININE 0.92 05/05/2022 09:30 AM    GFRAA >60.0 05/05/2022 09:30 AM    LABGLOM >60.0 05/05/2022 09:30 AM    GLUCOSE 100 05/05/2022 09:30 AM    PROT 7.6 04/14/2022 11:23 AM    LABALBU 4.5 04/14/2022 11:23 AM    CALCIUM 9.4 05/05/2022 09:30 AM    BILITOT 0.4 04/14/2022 11:23 AM    ALKPHOS 83 04/14/2022 11:23 AM    AST <5 04/14/2022 11:23 AM    ALT <5 04/14/2022 11:23 AM     BMP:    Lab Results   Component Value Date/Time     05/05/2022 09:30 AM    K 4.8 05/05/2022 09:30 AM    CL 97 05/05/2022 09:30 AM    CO2 24 05/05/2022 09:30 AM    BUN 12 05/05/2022 09:30 AM    LABALBU 4.5 04/14/2022 11:23 AM    CREATININE 0.92 05/05/2022 09:30 AM    CALCIUM 9.4 05/05/2022 09:30 AM    GFRAA >60.0 05/05/2022 09:30 AM    LABGLOM >60.0 05/05/2022 09:30 AM    GLUCOSE 100 05/05/2022 09:30 AM     Magnesium:  No results found for: MG  TSH:No results found for: TSHFT4, TSH          There is no problem list on file for this patient. There are no discontinued medications. Modified Medications    No medications on file       No orders of the defined types were placed in this encounter. Assessment/Plan:    1. Chest pain, unspecified type       2. KNIGHT (dyspnea on exertion)  Stress and echo negative       4. Hypertension, unspecified type  Stable low salt diet   - US SCREENING FOR AAA; Future    5. Bilateral carotid bruits     - US CAROTID ARTERY BILATERAL; - negative    6. Former smoker     - [place] AAA; - no AAA    Counseling:  Heart Healthy Lifestyle, Low Salt Diet, Take Precautions to Prevent Falls, and Walk Daily    Return in about 1 year (around 1/13/2024).       Electronically signed by Argentina Tam MD on 1/13/2023 at 3:07 PM

## 2023-02-22 DIAGNOSIS — J44.9 CHRONIC OBSTRUCTIVE PULMONARY DISEASE, UNSPECIFIED COPD TYPE (HCC): ICD-10-CM

## 2023-02-23 RX ORDER — FLUTICASONE FUROATE, UMECLIDINIUM BROMIDE AND VILANTEROL TRIFENATATE 100; 62.5; 25 UG/1; UG/1; UG/1
POWDER RESPIRATORY (INHALATION)
Qty: 1 EACH | Refills: 3 | Status: SHIPPED | OUTPATIENT
Start: 2023-02-23

## 2023-02-23 NOTE — TELEPHONE ENCOUNTER
Rx requested:  Requested Prescriptions     Pending Prescriptions Disp Refills    TRELEGY ELLIPTA 100-62.5-25 MCG/ACT AEPB inhaler [Pharmacy Med Name: Theresa Ruiz 100 mcg-62.5 mcg-25 mcg powder for inhalation] 1 each 3     Sig: Inhale 1 puff into the lungs daily       Last Office Visit:   12/1/2022      Next Visit Date:  Future Appointments   Date Time Provider Dajuan Zhouisti   2/24/2023  4:00 PM Falguni Avendaño MD [address] [place] [address]   6/1/2023  8:30 AM Nayeli Noriega MD [address]   1/15/2024  1:00 PM Nargis Mcclure MD [facility]

## 2023-02-24 ENCOUNTER — OFFICE VISIT (OUTPATIENT)
Dept: FAMILY MEDICINE CLINIC | Age: 58
End: 2023-02-24
Payer: COMMERCIAL

## 2023-02-24 VITALS
HEIGHT: 72 IN | OXYGEN SATURATION: 96 % | HEART RATE: 115 BPM | DIASTOLIC BLOOD PRESSURE: 90 MMHG | BODY MASS INDEX: 25.06 KG/M2 | SYSTOLIC BLOOD PRESSURE: 148 MMHG | TEMPERATURE: 97.3 F | WEIGHT: 185 LBS

## 2023-02-24 DIAGNOSIS — Z87.891 FORMER SMOKER: ICD-10-CM

## 2023-02-24 DIAGNOSIS — I51.89 DIASTOLIC DYSFUNCTION: ICD-10-CM

## 2023-02-24 DIAGNOSIS — J44.9 CHRONIC OBSTRUCTIVE PULMONARY DISEASE, UNSPECIFIED COPD TYPE (HCC): Primary | ICD-10-CM

## 2023-02-24 DIAGNOSIS — R91.1 LUNG NODULE: ICD-10-CM

## 2023-02-24 DIAGNOSIS — I10 PRIMARY HYPERTENSION: ICD-10-CM

## 2023-02-24 PROCEDURE — 99213 OFFICE O/P EST LOW 20 MIN: CPT | Performed by: FAMILY MEDICINE

## 2023-02-24 PROCEDURE — 3080F DIAST BP >= 90 MM HG: CPT | Performed by: FAMILY MEDICINE

## 2023-02-24 PROCEDURE — G8427 DOCREV CUR MEDS BY ELIG CLIN: HCPCS | Performed by: FAMILY MEDICINE

## 2023-02-24 PROCEDURE — 3017F COLORECTAL CA SCREEN DOC REV: CPT | Performed by: FAMILY MEDICINE

## 2023-02-24 PROCEDURE — 3023F SPIROM DOC REV: CPT | Performed by: FAMILY MEDICINE

## 2023-02-24 PROCEDURE — G8484 FLU IMMUNIZE NO ADMIN: HCPCS | Performed by: FAMILY MEDICINE

## 2023-02-24 PROCEDURE — 3077F SYST BP >= 140 MM HG: CPT | Performed by: FAMILY MEDICINE

## 2023-02-24 PROCEDURE — G8419 CALC BMI OUT NRM PARAM NOF/U: HCPCS | Performed by: FAMILY MEDICINE

## 2023-02-24 PROCEDURE — 1036F TOBACCO NON-USER: CPT | Performed by: FAMILY MEDICINE

## 2023-02-24 SDOH — ECONOMIC STABILITY: FOOD INSECURITY: WITHIN THE PAST 12 MONTHS, THE FOOD YOU BOUGHT JUST DIDN'T LAST AND YOU DIDN'T HAVE MONEY TO GET MORE.: NEVER TRUE

## 2023-02-24 SDOH — ECONOMIC STABILITY: INCOME INSECURITY: HOW HARD IS IT FOR YOU TO PAY FOR THE VERY BASICS LIKE FOOD, HOUSING, MEDICAL CARE, AND HEATING?: NOT HARD AT ALL

## 2023-02-24 SDOH — ECONOMIC STABILITY: FOOD INSECURITY: WITHIN THE PAST 12 MONTHS, YOU WORRIED THAT YOUR FOOD WOULD RUN OUT BEFORE YOU GOT MONEY TO BUY MORE.: NEVER TRUE

## 2023-02-24 SDOH — ECONOMIC STABILITY: HOUSING INSECURITY
IN THE LAST 12 MONTHS, WAS THERE A TIME WHEN YOU DID NOT HAVE A STEADY PLACE TO SLEEP OR SLEPT IN A SHELTER (INCLUDING NOW)?: NO

## 2023-02-24 ASSESSMENT — PATIENT HEALTH QUESTIONNAIRE - PHQ9
SUM OF ALL RESPONSES TO PHQ QUESTIONS 1-9: 0
SUM OF ALL RESPONSES TO PHQ QUESTIONS 1-9: 0
SUM OF ALL RESPONSES TO PHQ9 QUESTIONS 1 & 2: 0
SUM OF ALL RESPONSES TO PHQ QUESTIONS 1-9: 0
2. FEELING DOWN, DEPRESSED OR HOPELESS: 0
SUM OF ALL RESPONSES TO PHQ QUESTIONS 1-9: 0
1. LITTLE INTEREST OR PLEASURE IN DOING THINGS: 0

## 2023-02-24 ASSESSMENT — ENCOUNTER SYMPTOMS
DIARRHEA: 0
COUGH: 0
CONSTIPATION: 0
SHORTNESS OF BREATH: 0
RHINORRHEA: 0
ABDOMINAL PAIN: 0
SORE THROAT: 0
WHEEZING: 0

## 2023-02-24 NOTE — PROGRESS NOTES
[address] PRIMARY CARE  [address] [facility] [address]. [id number]  Dept: [phone]  Dept Fax: [phone]  Loc: [phone]     Chief Complaint  Chief Complaint   Patient presents with    [facility]     Previous PCP Dr. Blaine Mcelroy     From visit with Dr. 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 Dann Alves (pulm) and following a lung nodule    Review of Systems   Constitutional:  Negative for chills and fever. HENT:  Negative for congestion, rhinorrhea and sore throat. Respiratory:  Negative for cough, shortness of breath and wheezing. Gastrointestinal:  Negative for abdominal pain, constipation and diarrhea. Endocrine: Negative for polydipsia and polyuria. Genitourinary:  Negative for dysuria, frequency and urgency. Neurological:  Negative for syncope, light-headedness, numbness and headaches. Psychiatric/Behavioral:  Negative for sleep disturbance. The patient is not nervous/anxious. No past medical history on file.   Past Surgical History:   Procedure Laterality Date    APPENDECTOMY      EYE SURGERY      TONSILLECTOMY      UPPER GASTROINTESTINAL ENDOSCOPY  11/30/12    DR Sarmad Medrano     Social History     Socioeconomic History    Marital status:      Spouse name: Not on file    Number of children: Not on file    Years of education: Not on file    Highest education level: Not on file   Occupational History    Not on file   Tobacco Use    Smoking status: Former     Packs/day: 1.00     Years: 40.00     Pack years: 40.00     Types: Cigarettes Start date: 16     Quit date: 2019     Years since quittin.1    Smokeless tobacco: Never   Vaping Use    Vaping Use: Former    Substances: Nicotine   Substance and Sexual Activity    Alcohol use: Not Currently    Drug use: No    Sexual activity: Not on file   Other Topics Concern    Not on file   Social History Narrative    Not on file     Social Determinants of Health     Financial Resource Strain: Low Risk     Difficulty of Paying Living Expenses: Not hard at all   Food Insecurity: No Food Insecurity    Worried About [address] [employer] in the Last Year: Never true    [address] [employer] in the Last Year: Never true   Transportation Needs: Unknown    Lack of Transportation (Medical): Not on file    Lack of Transportation (Non-Medical): No   Physical Activity: Not on file   Stress: Not on file   Social Connections: Not on file   Intimate Partner Violence: Not on file   Housing Stability: Unknown    Unable to Pay for Housing in the Last Year: Not on file    Number of Places Lived in the Last Year: Not on file    Unstable Housing in the Last Year: No     Family History   Problem Relation Age of Onset    Other Mother     Diabetes Father     Atrial Fibrillation Brother     Heart Attack Paternal Grandfather       No Known Allergies  Current Outpatient Medications   Medication Sig Dispense Refill    TRELEGY ELLIPTA 100-62.5-25 MCG/ACT AEPB inhaler Inhale 1 puff into the lungs daily 1 each 3    albuterol sulfate  (90 Base) MCG/ACT inhaler Inhale 2 puffs into the lungs every 6 hours as needed for Wheezing 1 each 3     No current facility-administered medications for this visit. Vitals:    23 1553 23 1609   BP: (!) 148/90 (!) 148/90   Pulse: (!) 115    Temp: 97.3 °F (36.3 °C)    TempSrc: Infrared    SpO2: 96%    Weight: 185 lb (83.9 kg)    Height: 5' 11.5\" (1.816 m)        Physical exam:  Physical Exam  Vitals reviewed. Constitutional:       General: He is not in acute distress. Appearance: He is well-developed. HENT:      Head: Normocephalic and atraumatic. Right Ear: Tympanic membrane, ear canal and external ear normal.      Left Ear: Tympanic membrane, ear canal and external ear normal.      Mouth/Throat:      Pharynx: No oropharyngeal exudate. Neck:      Thyroid: No thyromegaly. Cardiovascular:      Rate and Rhythm: Normal rate and regular rhythm. Heart sounds: Normal heart sounds. No murmur heard. Pulmonary:      Effort: Pulmonary effort is normal. No respiratory distress. Breath sounds: Normal breath sounds. No wheezing. Abdominal:      General: There is no distension. Palpations: Abdomen is soft. Tenderness: There is no abdominal tenderness. There is no guarding or rebound. Musculoskeletal:      Cervical back: Normal range of motion. Lymphadenopathy:      Cervical: No cervical adenopathy. Skin:     General: Skin is warm and dry. Neurological:      Mental Status: He is alert and oriented to person, place, and time. Psychiatric:         Behavior: Behavior normal.       Assessment/Plan:  59 y.o. male here mainly for establish:  - COPD: controlled; will continue on current regimen   - HTN: not at goal but will we can adjust at future visit if remains high     Diagnosis Orders   1. Chronic obstructive pulmonary disease, unspecified COPD type ([address]        2. Diastolic dysfunction        3. Primary hypertension        4. Former smoker        5.  Lung nodule             Return in about 9 months (around 11/24/2023) for annual exam.    [place] MD

## 2023-04-06 ENCOUNTER — OFFICE VISIT (OUTPATIENT)
Dept: FAMILY MEDICINE CLINIC | Age: 58
End: 2023-04-06
Payer: COMMERCIAL

## 2023-04-06 VITALS
OXYGEN SATURATION: 97 % | SYSTOLIC BLOOD PRESSURE: 132 MMHG | WEIGHT: 181.8 LBS | BODY MASS INDEX: 24.62 KG/M2 | DIASTOLIC BLOOD PRESSURE: 80 MMHG | TEMPERATURE: 97.3 F | HEART RATE: 94 BPM | HEIGHT: 72 IN

## 2023-04-06 DIAGNOSIS — N52.9 ERECTILE DYSFUNCTION, UNSPECIFIED ERECTILE DYSFUNCTION TYPE: Primary | ICD-10-CM

## 2023-04-06 DIAGNOSIS — B37.2 CANDIDAL INTERTRIGO: ICD-10-CM

## 2023-04-06 PROCEDURE — 3079F DIAST BP 80-89 MM HG: CPT | Performed by: FAMILY MEDICINE

## 2023-04-06 PROCEDURE — 3017F COLORECTAL CA SCREEN DOC REV: CPT | Performed by: FAMILY MEDICINE

## 2023-04-06 PROCEDURE — 1036F TOBACCO NON-USER: CPT | Performed by: FAMILY MEDICINE

## 2023-04-06 PROCEDURE — 3075F SYST BP GE 130 - 139MM HG: CPT | Performed by: FAMILY MEDICINE

## 2023-04-06 PROCEDURE — 99213 OFFICE O/P EST LOW 20 MIN: CPT | Performed by: FAMILY MEDICINE

## 2023-04-06 PROCEDURE — G8427 DOCREV CUR MEDS BY ELIG CLIN: HCPCS | Performed by: FAMILY MEDICINE

## 2023-04-06 PROCEDURE — G8419 CALC BMI OUT NRM PARAM NOF/U: HCPCS | Performed by: FAMILY MEDICINE

## 2023-04-06 RX ORDER — SILDENAFIL 50 MG/1
50 TABLET, FILM COATED ORAL PRN
Qty: 10 TABLET | Refills: 3 | Status: SHIPPED | OUTPATIENT
Start: 2023-04-06

## 2023-04-06 ASSESSMENT — ENCOUNTER SYMPTOMS
RHINORRHEA: 0
COUGH: 0
SORE THROAT: 0
WHEEZING: 0
SHORTNESS OF BREATH: 0
DIARRHEA: 0
CONSTIPATION: 0
ABDOMINAL PAIN: 0

## 2023-04-06 NOTE — PROGRESS NOTES
[address] PRIMARY CARE  [address] [facility] 76. [phone]  Dept: [phone]  Dept Fax: [phone]  Loc: [phone]     Chief Complaint  Chief Complaint   Patient presents with    Erectile Dysfunction     Asking for Viagra       HPI:  59 y.o.male who presents for the following:      ED: trouble with adequate erections this past year; no infection, no trauma, no dischare; gets itching near the scrotum at times. Review of Systems   Constitutional:  Negative for chills and fever. HENT:  Negative for congestion, rhinorrhea and sore throat. Respiratory:  Negative for cough, shortness of breath and wheezing. Gastrointestinal:  Negative for abdominal pain, constipation and diarrhea. Endocrine: Negative for polydipsia and polyuria. Genitourinary:  Negative for dysuria, frequency and urgency. Neurological:  Negative for syncope, light-headedness, numbness and headaches. Psychiatric/Behavioral:  Negative for sleep disturbance. The patient is not nervous/anxious. No past medical history on file.   Past Surgical History:   Procedure Laterality Date    APPENDECTOMY      EYE SURGERY      TONSILLECTOMY      UPPER GASTROINTESTINAL ENDOSCOPY  12    DR Vanda Blackman     Social History     Socioeconomic History    Marital status:      Spouse name: Not on file    Number of children: Not on file    Years of education: Not on file    Highest education level: Not on file   Occupational History    Not on file   Tobacco Use    Smoking status: Former     Packs/day: 1.00     Years: 40.00     Pack years: 40.00     Types: Cigarettes     Start date: 16     Quit date: 2019     Years since quittin.2    Smokeless tobacco: Never   Vaping Use    Vaping Use: Former    Substances: Nicotine   Substance and Sexual Activity    Alcohol use: Not Currently    Drug use: No    Sexual activity: Not on file   Other Topics Concern    Not on

## 2023-04-18 ENCOUNTER — OFFICE VISIT (OUTPATIENT)
Dept: FAMILY MEDICINE CLINIC | Age: 58
End: 2023-04-18
Payer: COMMERCIAL

## 2023-04-18 VITALS
DIASTOLIC BLOOD PRESSURE: 80 MMHG | WEIGHT: 175 LBS | BODY MASS INDEX: 23.7 KG/M2 | HEIGHT: 72 IN | HEART RATE: 98 BPM | OXYGEN SATURATION: 94 % | TEMPERATURE: 97.4 F | SYSTOLIC BLOOD PRESSURE: 110 MMHG

## 2023-04-18 DIAGNOSIS — J44.9 CHRONIC OBSTRUCTIVE PULMONARY DISEASE, UNSPECIFIED COPD TYPE (HCC): ICD-10-CM

## 2023-04-18 DIAGNOSIS — J44.1 COPD EXACERBATION (HCC): Primary | ICD-10-CM

## 2023-04-18 LAB
INFLUENZA A ANTIBODY: NEGATIVE
INFLUENZA B ANTIBODY: NEGATIVE
Lab: NORMAL
PERFORMING INSTRUMENT: NORMAL
QC PASS/FAIL: NORMAL
SARS-COV-2, POC: NORMAL

## 2023-04-18 PROCEDURE — 3074F SYST BP LT 130 MM HG: CPT | Performed by: FAMILY MEDICINE

## 2023-04-18 PROCEDURE — G8420 CALC BMI NORM PARAMETERS: HCPCS | Performed by: FAMILY MEDICINE

## 2023-04-18 PROCEDURE — 1036F TOBACCO NON-USER: CPT | Performed by: FAMILY MEDICINE

## 2023-04-18 PROCEDURE — 3017F COLORECTAL CA SCREEN DOC REV: CPT | Performed by: FAMILY MEDICINE

## 2023-04-18 PROCEDURE — G8427 DOCREV CUR MEDS BY ELIG CLIN: HCPCS | Performed by: FAMILY MEDICINE

## 2023-04-18 PROCEDURE — 99213 OFFICE O/P EST LOW 20 MIN: CPT | Performed by: FAMILY MEDICINE

## 2023-04-18 PROCEDURE — 3079F DIAST BP 80-89 MM HG: CPT | Performed by: FAMILY MEDICINE

## 2023-04-18 PROCEDURE — 87804 INFLUENZA ASSAY W/OPTIC: CPT | Performed by: FAMILY MEDICINE

## 2023-04-18 PROCEDURE — 3023F SPIROM DOC REV: CPT | Performed by: FAMILY MEDICINE

## 2023-04-18 PROCEDURE — 87426 SARSCOV CORONAVIRUS AG IA: CPT | Performed by: FAMILY MEDICINE

## 2023-04-18 RX ORDER — AZITHROMYCIN 250 MG/1
250 TABLET, FILM COATED ORAL SEE ADMIN INSTRUCTIONS
Qty: 6 TABLET | Refills: 0 | Status: SHIPPED | OUTPATIENT
Start: 2023-04-18 | End: 2023-04-23

## 2023-04-18 RX ORDER — PREDNISONE 20 MG/1
60 TABLET ORAL DAILY
Qty: 15 TABLET | Refills: 0 | Status: SHIPPED | OUTPATIENT
Start: 2023-04-18 | End: 2023-04-23

## 2023-04-18 ASSESSMENT — ENCOUNTER SYMPTOMS
DIARRHEA: 0
RHINORRHEA: 0
WHEEZING: 1
CONSTIPATION: 0
SORE THROAT: 0
SHORTNESS OF BREATH: 1
ABDOMINAL PAIN: 0
COUGH: 1

## 2023-04-18 NOTE — PROGRESS NOTES
Medium Adult   Pulse: 98   Temp: 97.4 °F (36.3 °C)   TempSrc: Infrared   SpO2: 94%   Weight: 175 lb (79.4 kg)   Height: 5' 11.5\" (1.816 m)       Physical exam:  Physical Exam  Vitals reviewed. Constitutional:       General: He is not in acute distress. Appearance: He is well-developed. HENT:      Head: Normocephalic and atraumatic. Right Ear: Tympanic membrane, ear canal and external ear normal.      Left Ear: Tympanic membrane, ear canal and external ear normal.      Mouth/Throat:      Pharynx: No oropharyngeal exudate. Neck:      Thyroid: No thyromegaly. Cardiovascular:      Rate and Rhythm: Normal rate and regular rhythm. Heart sounds: Normal heart sounds. No murmur heard. Pulmonary:      Effort: Pulmonary effort is normal. No respiratory distress. Breath sounds: Decreased air movement present. Decreased breath sounds present. Abdominal:      General: There is no distension. Palpations: Abdomen is soft. Tenderness: There is no abdominal tenderness. There is no guarding or rebound. Musculoskeletal:      Cervical back: Normal range of motion. Lymphadenopathy:      Cervical: No cervical adenopathy. Skin:     General: Skin is warm and dry. Neurological:      Mental Status: He is alert and oriented to person, place, and time. Psychiatric:         Behavior: Behavior normal.       Assessment/Plan:  59 y.o. male here mainly for COPD exac:  - neg COVId/flu; COPD exac; provided 5 days pred/azithro     Diagnosis Orders   1. COPD exacerbation ([facility] [address]  POCT COVID-19, Antigen    POCT Influenza A/B      2. Chronic obstructive pulmonary disease, unspecified COPD type (HCC)  predniSONE (DELTASONE) 20 MG tablet    azithromycin (ZITHROMAX) 250 MG tablet           Return if symptoms worsen or fail to improve.     Shadia Cannon MD

## 2023-05-16 ENCOUNTER — OFFICE VISIT (OUTPATIENT)
Dept: FAMILY MEDICINE CLINIC | Age: 58
End: 2023-05-16
Payer: COMMERCIAL

## 2023-05-16 VITALS
HEART RATE: 78 BPM | OXYGEN SATURATION: 96 % | BODY MASS INDEX: 23.84 KG/M2 | SYSTOLIC BLOOD PRESSURE: 128 MMHG | DIASTOLIC BLOOD PRESSURE: 86 MMHG | TEMPERATURE: 98.1 F | WEIGHT: 176 LBS | HEIGHT: 72 IN

## 2023-05-16 DIAGNOSIS — H57.89 EYE SWELLING, RIGHT: Primary | ICD-10-CM

## 2023-05-16 PROCEDURE — 3079F DIAST BP 80-89 MM HG: CPT | Performed by: FAMILY MEDICINE

## 2023-05-16 PROCEDURE — 3074F SYST BP LT 130 MM HG: CPT | Performed by: FAMILY MEDICINE

## 2023-05-16 PROCEDURE — 3017F COLORECTAL CA SCREEN DOC REV: CPT | Performed by: FAMILY MEDICINE

## 2023-05-16 PROCEDURE — G8427 DOCREV CUR MEDS BY ELIG CLIN: HCPCS | Performed by: FAMILY MEDICINE

## 2023-05-16 PROCEDURE — 99213 OFFICE O/P EST LOW 20 MIN: CPT | Performed by: FAMILY MEDICINE

## 2023-05-16 PROCEDURE — G8420 CALC BMI NORM PARAMETERS: HCPCS | Performed by: FAMILY MEDICINE

## 2023-05-16 PROCEDURE — 96372 THER/PROPH/DIAG INJ SC/IM: CPT | Performed by: FAMILY MEDICINE

## 2023-05-16 PROCEDURE — 1036F TOBACCO NON-USER: CPT | Performed by: FAMILY MEDICINE

## 2023-05-16 RX ORDER — METHYLPREDNISOLONE ACETATE 80 MG/ML
40 INJECTION, SUSPENSION INTRA-ARTICULAR; INTRALESIONAL; INTRAMUSCULAR; SOFT TISSUE ONCE
Status: COMPLETED | OUTPATIENT
Start: 2023-05-16 | End: 2023-05-16

## 2023-05-16 RX ORDER — METHYLPREDNISOLONE ACETATE 40 MG/ML
40 INJECTION, SUSPENSION INTRA-ARTICULAR; INTRALESIONAL; INTRAMUSCULAR; SOFT TISSUE ONCE
Status: DISCONTINUED | OUTPATIENT
Start: 2023-05-16 | End: 2023-05-16

## 2023-05-16 RX ORDER — LORATADINE 10 MG/1
10 TABLET ORAL DAILY
Qty: 30 TABLET | Refills: 3 | Status: SHIPPED | OUTPATIENT
Start: 2023-05-16

## 2023-05-16 RX ORDER — CEPHALEXIN 500 MG/1
500 CAPSULE ORAL 4 TIMES DAILY
Qty: 20 CAPSULE | Refills: 0 | Status: SHIPPED | OUTPATIENT
Start: 2023-05-16 | End: 2023-05-21

## 2023-05-16 RX ADMIN — METHYLPREDNISOLONE ACETATE 40 MG: 80 INJECTION, SUSPENSION INTRA-ARTICULAR; INTRALESIONAL; INTRAMUSCULAR; SOFT TISSUE at 10:59

## 2023-05-16 ASSESSMENT — ENCOUNTER SYMPTOMS
SORE THROAT: 0
DIARRHEA: 0
ABDOMINAL PAIN: 0
WHEEZING: 0
SHORTNESS OF BREATH: 0
EYE ITCHING: 1
CONSTIPATION: 0
COUGH: 0
RHINORRHEA: 0

## 2023-05-16 NOTE — PROGRESS NOTES
Drug use: No    Sexual activity: Not on file   Other Topics Concern    Not on file   Social History Narrative    Not on file     Social Determinants of Health     Financial Resource Strain: Low Risk     Difficulty of Paying Living Expenses: Not hard at all   Food Insecurity: No Food Insecurity    Worried About [address] in the Last Year: Never true    [address] in the Last Year: Never true   Transportation Needs: Unknown    Lack of Transportation (Medical): Not on file    Lack of Transportation (Non-Medical): No   Physical Activity: Not on file   Stress: Not on file   Social Connections: Not on file   Intimate Partner Violence: Not on file   Housing Stability: Unknown    Unable to Pay for Housing in the Last Year: Not on file    Number of Places Lived in the Last Year: Not on file    Unstable Housing in the Last Year: No     Family History   Problem Relation Age of Onset    Other Mother     Diabetes Father     Atrial Fibrillation Brother     Heart Attack Paternal Grandfather       No Known Allergies  Current Outpatient Medications   Medication Sig Dispense Refill    cephALEXin (KEFLEX) 500 MG capsule Take 1 capsule by mouth 4 times daily for 5 days 20 capsule 0    loratadine (CLARITIN) 10 MG tablet Take 1 tablet by mouth daily 30 tablet 3    tadalafil (CIALIS) 20 MG tablet Take 1 tablet by mouth daily as needed for Erectile Dysfunction (take 30min prior to sexual acitivity) 10 tablet 3    nystatin-triamcinolone (MYCOLOG II) 596250-2.1 UNIT/GM-% cream Apply topically 2 times daily. 30 g 1    TRELEGY ELLIPTA 100-62.5-25 MCG/ACT AEPB inhaler Inhale 1 puff into the lungs daily 1 each 3    albuterol sulfate  (90 Base) MCG/ACT inhaler Inhale 2 puffs into the lungs every 6 hours as needed for Wheezing 1 each 3     No current facility-administered medications for this visit.          Vitals:    05/16/23 1031   BP: 128/86   Pulse: 78   Temp: 98.1 °F (36.7 °C)   TempSrc: Infrared   SpO2: 96%   Weight:

## 2023-06-01 ENCOUNTER — OFFICE VISIT (OUTPATIENT)
Dept: PULMONOLOGY | Age: 58
End: 2023-06-01
Payer: COMMERCIAL

## 2023-06-01 VITALS
DIASTOLIC BLOOD PRESSURE: 72 MMHG | WEIGHT: 174.6 LBS | RESPIRATION RATE: 16 BRPM | HEIGHT: 72 IN | OXYGEN SATURATION: 98 % | HEART RATE: 64 BPM | BODY MASS INDEX: 23.65 KG/M2 | TEMPERATURE: 96.9 F | SYSTOLIC BLOOD PRESSURE: 116 MMHG

## 2023-06-01 DIAGNOSIS — I51.89 DIASTOLIC DYSFUNCTION: ICD-10-CM

## 2023-06-01 DIAGNOSIS — R91.1 LUNG NODULE: ICD-10-CM

## 2023-06-01 DIAGNOSIS — Z87.891 PERSONAL HISTORY OF TOBACCO USE: Primary | ICD-10-CM

## 2023-06-01 DIAGNOSIS — J44.9 CHRONIC OBSTRUCTIVE PULMONARY DISEASE, UNSPECIFIED COPD TYPE (HCC): ICD-10-CM

## 2023-06-01 PROCEDURE — 3017F COLORECTAL CA SCREEN DOC REV: CPT | Performed by: INTERNAL MEDICINE

## 2023-06-01 PROCEDURE — 3074F SYST BP LT 130 MM HG: CPT | Performed by: INTERNAL MEDICINE

## 2023-06-01 PROCEDURE — G8427 DOCREV CUR MEDS BY ELIG CLIN: HCPCS | Performed by: INTERNAL MEDICINE

## 2023-06-01 PROCEDURE — G8420 CALC BMI NORM PARAMETERS: HCPCS | Performed by: INTERNAL MEDICINE

## 2023-06-01 PROCEDURE — 3078F DIAST BP <80 MM HG: CPT | Performed by: INTERNAL MEDICINE

## 2023-06-01 PROCEDURE — 99213 OFFICE O/P EST LOW 20 MIN: CPT | Performed by: INTERNAL MEDICINE

## 2023-06-01 PROCEDURE — G0296 VISIT TO DETERM LDCT ELIG: HCPCS | Performed by: INTERNAL MEDICINE

## 2023-06-01 PROCEDURE — 1036F TOBACCO NON-USER: CPT | Performed by: INTERNAL MEDICINE

## 2023-06-01 PROCEDURE — 3023F SPIROM DOC REV: CPT | Performed by: INTERNAL MEDICINE

## 2023-06-01 NOTE — PROGRESS NOTES
shot  Pneumonia vaccine        Orders Placed This Encounter   Procedures    CT Lung Screen (Annual/Baseline)     Age: Patient is 59 y.o. Smoking History: Social History    Tobacco Use      Smoking status: Former        Packs/day: 1.00        Years: 40.00        Pack years: 36        Types: Cigarettes        Start date:         Quit date:         Years since quittin.4      Smokeless tobacco: Never    Vaping Use      Vaping Use: Former        Substances: Nicotine    Alcohol use: Not Currently    Drug use: No   Pack years: 36    Date of last lung cancer screenin2022     Standing Status:   Future     Standing Expiration Date:   2024     Order Specific Question:   Is there documentation of shared decision making? Answer:   Yes     Order Specific Question:   Is this a low dose CT or a routine CT? Answer:   Low Dose CT [1]     Order Specific Question:   Is this the first (baseline) CT or an annual exam?     Answer: Annual [2]     Order Specific Question:   Does the patient show any signs or symptoms of lung cancer? Answer:   No     Order Specific Question:   Smoking Status? Answer: Former [4]     Order Specific Question:   Date quit smoking? (must be within 15 years)     Answer:   2019     Order Specific Question:   Smoking packs per day? Answer:   1     Order Specific Question:   Years smoking? Answer:   40    Full PFT Study With Bronchodilator     Standing Status:   Future     Standing Expiration Date:   2024    [place] VISIT TO DISCUSS LUNG CA SCREEN W LDCT     No orders of the defined types were placed in this encounter. Discussed with patient the importance of exercise and weight control and  overall health and well-being. Reviewed with the patient: current clinical status, medications, activities and diet.       Side effects, adverse effects of the medication prescribed today, as well as treatment plan and result expectations have been discussed

## 2023-07-15 DIAGNOSIS — J44.9 CHRONIC OBSTRUCTIVE PULMONARY DISEASE, UNSPECIFIED COPD TYPE (HCC): ICD-10-CM

## 2023-07-17 DIAGNOSIS — J44.9 CHRONIC OBSTRUCTIVE PULMONARY DISEASE, UNSPECIFIED COPD TYPE (HCC): ICD-10-CM

## 2023-07-17 RX ORDER — FLUTICASONE FUROATE, UMECLIDINIUM BROMIDE AND VILANTEROL TRIFENATATE 100; 62.5; 25 UG/1; UG/1; UG/1
POWDER RESPIRATORY (INHALATION)
Qty: 1 EACH | Refills: 3 | Status: SHIPPED | OUTPATIENT
Start: 2023-07-17

## 2023-07-17 RX ORDER — FLUTICASONE FUROATE, UMECLIDINIUM BROMIDE AND VILANTEROL TRIFENATATE 100; 62.5; 25 UG/1; UG/1; UG/1
1 POWDER RESPIRATORY (INHALATION) DAILY
Qty: 1 EACH | Refills: 3 | OUTPATIENT
Start: 2023-07-17

## 2023-07-17 NOTE — TELEPHONE ENCOUNTER
Rx requested:  Requested Prescriptions     Pending Prescriptions Disp Refills    fluticasone-umeclidin-vilant (TRELEGY ELLIPTA) 100-62.5-25 MCG/ACT AEPB inhaler 1 each 3     Sig: Inhale 1 puff into the lungs daily       Last Office Visit:   6/1/2023      Next Visit Date:  Future Appointments   Date Time Provider [address] [place] [address]   11/30/2023  9:15 AM Marianna Ramos MD [address] [place] [address]   1/15/2024  1:00 PM Magdaleno Monzon MD [facility]

## 2023-07-17 NOTE — TELEPHONE ENCOUNTER
Rx requested:  Requested Prescriptions     Pending Prescriptions Disp Refills    TRELEGY ELLIPTA 100-62.5-25 MCG/ACT AEPB inhaler [Pharmacy Med Name: Chadjanet Celestineblane 100 mcg-62.5 mcg-25 mcg powder for inhalation]  3     Sig: Inhale 1 puff into the lungs daily       Last Office Visit:   6/1/2023      Next Visit Date:  Future Appointments   Date Time Provider [address]   11/30/2023  9:15 AM Jimmie Fox MD [facility]   1/15/2024  1:00 PM Radha Santana MD [facility]

## 2023-09-14 ENCOUNTER — OFFICE VISIT (OUTPATIENT)
Dept: FAMILY MEDICINE CLINIC | Age: 58
End: 2023-09-14

## 2023-09-14 VITALS
SYSTOLIC BLOOD PRESSURE: 132 MMHG | HEIGHT: 72 IN | OXYGEN SATURATION: 91 % | HEART RATE: 74 BPM | WEIGHT: 151.6 LBS | BODY MASS INDEX: 20.53 KG/M2 | DIASTOLIC BLOOD PRESSURE: 80 MMHG

## 2023-09-14 DIAGNOSIS — J44.9 CHRONIC OBSTRUCTIVE PULMONARY DISEASE, UNSPECIFIED COPD TYPE (HCC): Primary | ICD-10-CM

## 2023-09-14 RX ORDER — AZITHROMYCIN 250 MG/1
250 TABLET, FILM COATED ORAL SEE ADMIN INSTRUCTIONS
Qty: 6 TABLET | Refills: 0 | Status: SHIPPED | OUTPATIENT
Start: 2023-09-14 | End: 2023-09-19

## 2023-09-14 RX ORDER — PREDNISONE 20 MG/1
60 TABLET ORAL DAILY
Qty: 30 TABLET | Refills: 0 | Status: SHIPPED | OUTPATIENT
Start: 2023-09-14 | End: 2023-09-24

## 2023-09-14 ASSESSMENT — ENCOUNTER SYMPTOMS
ABDOMINAL PAIN: 0
COUGH: 0
CONSTIPATION: 0
RHINORRHEA: 0
WHEEZING: 0
DIARRHEA: 0
SORE THROAT: 0
SHORTNESS OF BREATH: 0

## 2023-09-14 NOTE — PROGRESS NOTES
sulfate  (90 Base) MCG/ACT inhaler Inhale 2 puffs into the lungs every 6 hours as needed for Wheezing 1 each 3     No current facility-administered medications for this visit. Vitals:    09/14/23 1406   BP: 132/80   Site: Left Upper Arm   Position: Sitting   Cuff Size: Medium Adult   Pulse: 74   SpO2: 91%   Weight: 151 lb 9.6 oz (68.8 kg)   Height: 5' 11.5\" (1.816 m)       Physical exam:  Physical Exam  Vitals reviewed. Constitutional:       General: He is not in acute distress. Appearance: He is well-developed. HENT:      Head: Normocephalic and atraumatic. Cardiovascular:      Rate and Rhythm: Normal rate. Pulmonary:      Effort: Pulmonary effort is normal. No respiratory distress. Musculoskeletal:      Cervical back: Normal range of motion. Skin:     General: Skin is warm and dry. Neurological:      Mental Status: He is alert and oriented to person, place, and time. Psychiatric:         Behavior: Behavior normal.         Assessment/Plan:  59 y.o. male here mainly for COPD:  - exacerbation today; 5 days pred/azithro; due for pneumonia shot     Diagnosis Orders   1. Chronic obstructive pulmonary disease, unspecified COPD type (HCC)  predniSONE (DELTASONE) 20 MG tablet    azithromycin (ZITHROMAX) 250 MG tablet    Pneumococcal, PCV20, PREVNAR 20, (age 25 yrs+), IM, PF           Return if symptoms worsen or fail to improve.     Sunita Du MD

## 2023-11-13 DIAGNOSIS — H57.89 EYE SWELLING, RIGHT: ICD-10-CM

## 2023-11-13 RX ORDER — LORATADINE 10 MG/1
10 TABLET ORAL DAILY
Qty: 30 TABLET | Refills: 5 | Status: SHIPPED | OUTPATIENT
Start: 2023-11-13

## 2023-11-13 NOTE — TELEPHONE ENCOUNTER
Comments:     Last Office Visit (last PCP visit):   9/14/2023    Next Visit Date:  Future Appointments   Date Time Provider Department Center   11/30/2023  9:15 AM Eric Giraldo MD [facility]   1/15/2024  1:00 PM Marco Antonio Gar MD [facility]       **If hasn't been seen in over a year OR hasn't followed up according to last diabetes/ADHD visit, make appointment for patient before sending refill to provider.    Rx requested:  Requested Prescriptions     Pending Prescriptions Disp Refills    loratadine (CLARITIN) 10 MG tablet 30 tablet 3     Sig: Take 1 tablet by mouth daily

## 2023-11-14 ENCOUNTER — OFFICE VISIT (OUTPATIENT)
Dept: FAMILY MEDICINE CLINIC | Age: 58
End: 2023-11-14
Payer: COMMERCIAL

## 2023-11-14 VITALS
SYSTOLIC BLOOD PRESSURE: 130 MMHG | HEART RATE: 108 BPM | TEMPERATURE: 97.4 F | HEIGHT: 72 IN | DIASTOLIC BLOOD PRESSURE: 86 MMHG | WEIGHT: 152 LBS | BODY MASS INDEX: 20.59 KG/M2 | OXYGEN SATURATION: 94 %

## 2023-11-14 DIAGNOSIS — J44.9 CHRONIC OBSTRUCTIVE PULMONARY DISEASE, UNSPECIFIED COPD TYPE (HCC): Primary | ICD-10-CM

## 2023-11-14 DIAGNOSIS — J44.1 COPD EXACERBATION (HCC): ICD-10-CM

## 2023-11-14 LAB
Lab: NORMAL
PERFORMING INSTRUMENT: NORMAL
QC PASS/FAIL: NORMAL
SARS-COV-2, POC: NORMAL

## 2023-11-14 PROCEDURE — G8427 DOCREV CUR MEDS BY ELIG CLIN: HCPCS | Performed by: FAMILY MEDICINE

## 2023-11-14 PROCEDURE — 1036F TOBACCO NON-USER: CPT | Performed by: FAMILY MEDICINE

## 2023-11-14 PROCEDURE — 3079F DIAST BP 80-89 MM HG: CPT | Performed by: FAMILY MEDICINE

## 2023-11-14 PROCEDURE — G8484 FLU IMMUNIZE NO ADMIN: HCPCS | Performed by: FAMILY MEDICINE

## 2023-11-14 PROCEDURE — 3023F SPIROM DOC REV: CPT | Performed by: FAMILY MEDICINE

## 2023-11-14 PROCEDURE — G8420 CALC BMI NORM PARAMETERS: HCPCS | Performed by: FAMILY MEDICINE

## 2023-11-14 PROCEDURE — 3017F COLORECTAL CA SCREEN DOC REV: CPT | Performed by: FAMILY MEDICINE

## 2023-11-14 PROCEDURE — 87426 SARSCOV CORONAVIRUS AG IA: CPT | Performed by: FAMILY MEDICINE

## 2023-11-14 PROCEDURE — 99214 OFFICE O/P EST MOD 30 MIN: CPT | Performed by: FAMILY MEDICINE

## 2023-11-14 PROCEDURE — 3075F SYST BP GE 130 - 139MM HG: CPT | Performed by: FAMILY MEDICINE

## 2023-11-14 RX ORDER — PREDNISONE 20 MG/1
60 TABLET ORAL DAILY
Qty: 30 TABLET | Refills: 0 | Status: SHIPPED | OUTPATIENT
Start: 2023-11-14 | End: 2023-11-24

## 2023-11-14 RX ORDER — AZITHROMYCIN 250 MG/1
250 TABLET, FILM COATED ORAL SEE ADMIN INSTRUCTIONS
Qty: 6 TABLET | Refills: 0 | Status: SHIPPED | OUTPATIENT
Start: 2023-11-14 | End: 2023-11-19

## 2023-11-14 ASSESSMENT — ENCOUNTER SYMPTOMS
SORE THROAT: 0
ABDOMINAL PAIN: 0
SHORTNESS OF BREATH: 1
RHINORRHEA: 1
COUGH: 1
DIARRHEA: 0
CONSTIPATION: 0
WHEEZING: 1

## 2023-11-14 NOTE — PROGRESS NOTES
[address] PRIMARY CARE  3 [facility] [address]  Dept: [phone]  Dept Fax: : [phone]     Chief Complaint  Chief Complaint   Patient presents with    Shortness of Breath     Excess mucus, congestion, cough, x4 days; declined flu testing, only wants covid       HPI:  59 y.o.male who presents for the following:      URI symptoms: x5 days with tightness of breathing and some cough; no fever, runny nose; had to take a break walking through parking lot today    Review of Systems   Constitutional:  Negative for chills and fever. HENT:  Positive for congestion and rhinorrhea. Negative for sore throat. Respiratory:  Positive for cough, shortness of breath and wheezing. Gastrointestinal:  Negative for abdominal pain, constipation and diarrhea. Endocrine: Negative for polydipsia and polyuria. Genitourinary:  Negative for dysuria, frequency and urgency. Neurological:  Negative for syncope, light-headedness, numbness and headaches. Psychiatric/Behavioral:  Negative for sleep disturbance. The patient is not nervous/anxious. No past medical history on file.   Past Surgical History:   Procedure Laterality Date    APPENDECTOMY      EYE SURGERY      TONSILLECTOMY      UPPER GASTROINTESTINAL ENDOSCOPY  12    DR Nam Avery     Social History     Socioeconomic History    Marital status:      Spouse name: Not on file    Number of children: Not on file    Years of education: Not on file    Highest education level: Not on file   Occupational History    Not on file   Tobacco Use    Smoking status: Former     Packs/day: 1.00     Years: 40.00     Additional pack years: 0.00     Total pack years: 40.00     Types: Cigarettes     Start date: 16     Quit date: 2019     Years since quittin.8    Smokeless tobacco: Never   Vaping Use    Vaping Use: Former    Substances: Nicotine   Substance and

## 2023-11-22 DIAGNOSIS — J44.9 CHRONIC OBSTRUCTIVE PULMONARY DISEASE, UNSPECIFIED COPD TYPE (HCC): ICD-10-CM

## 2023-11-22 RX ORDER — FLUTICASONE FUROATE, UMECLIDINIUM BROMIDE AND VILANTEROL TRIFENATATE 100; 62.5; 25 UG/1; UG/1; UG/1
POWDER RESPIRATORY (INHALATION)
Qty: 1 EACH | Refills: 3 | Status: SHIPPED | OUTPATIENT
Start: 2023-11-22

## 2023-11-22 NOTE — TELEPHONE ENCOUNTER
Rx requested:  Requested Prescriptions     Pending Prescriptions Disp Refills    TRELEGY ELLIPTA 100-62.5-25 MCG/ACT AEPB inhaler [Pharmacy Med Name: Sierra Gracea 100 mcg-62.5 mcg-25 mcg powder for inhalation] 1 each 3     Sig: Inhale 1 puff into the lungs daily       Last Office Visit:   6/1/2023      Next Visit Date:  Future Appointments   Date Time Provider [address] [place] [address]   11/30/2023  9:15 AM Amara Faust MD [address] [place] [address]   1/15/2024  1:00 PM Toy Cormier MD [facility]

## 2023-11-30 ENCOUNTER — OFFICE VISIT (OUTPATIENT)
Dept: FAMILY MEDICINE CLINIC | Age: 58
End: 2023-11-30
Payer: COMMERCIAL

## 2023-11-30 ENCOUNTER — HOSPITAL ENCOUNTER (OUTPATIENT)
Age: 58
Setting detail: SPECIMEN
Discharge: HOME | End: 2023-11-30
Payer: COMMERCIAL

## 2023-11-30 VITALS
BODY MASS INDEX: 21.48 KG/M2 | DIASTOLIC BLOOD PRESSURE: 80 MMHG | OXYGEN SATURATION: 98 % | HEART RATE: 78 BPM | TEMPERATURE: 97.8 F | WEIGHT: 158.6 LBS | SYSTOLIC BLOOD PRESSURE: 120 MMHG | HEIGHT: 72 IN

## 2023-11-30 DIAGNOSIS — Z12.5 PROSTATE CANCER SCREENING: ICD-10-CM

## 2023-11-30 DIAGNOSIS — Z00.00 ANNUAL PHYSICAL EXAM: Primary | ICD-10-CM

## 2023-11-30 DIAGNOSIS — J44.9 CHRONIC OBSTRUCTIVE PULMONARY DISEASE, UNSPECIFIED COPD TYPE (HCC): ICD-10-CM

## 2023-11-30 DIAGNOSIS — Z00.00 ANNUAL PHYSICAL EXAM: ICD-10-CM

## 2023-11-30 LAB
ALBUMIN SERPL-MCNC: 4.2 G/DL (ref 3.5–4.6)
ALP SERPL-CCNC: 72 U/L (ref 35–104)
ALT SERPL-CCNC: 21 U/L (ref 0–41)
ANION GAP SERPL CALCULATED.3IONS-SCNC: 11 MEQ/L (ref 9–15)
AST SERPL-CCNC: 24 U/L (ref 0–40)
BILIRUB SERPL-MCNC: 0.5 MG/DL (ref 0.2–0.7)
BUN SERPL-MCNC: 18 MG/DL (ref 8–23)
CALCIUM SERPL-MCNC: 9.4 MG/DL (ref 8.5–9.9)
CHLORIDE SERPL-SCNC: 103 MEQ/L (ref 95–107)
CHOLEST SERPL-MCNC: 228 MG/DL (ref 0–199)
CO2 SERPL-SCNC: 27 MEQ/L (ref 20–31)
CREAT SERPL-MCNC: 0.84 MG/DL (ref 0.7–1.2)
GLOBULIN SER CALC-MCNC: 2.7 G/DL (ref 2.3–3.5)
GLUCOSE FASTING: 96 MG/DL (ref 70–99)
HDLC SERPL-MCNC: 52 MG/DL (ref 40–59)
LDL CHOLESTEROL CALCULATED: 161 MG/DL (ref 0–129)
POTASSIUM SERPL-SCNC: 5.4 MEQ/L (ref 3.4–4.9)
PROT SERPL-MCNC: 6.9 G/DL (ref 6.3–8)
PSA SERPL-MCNC: 0.55 NG/ML (ref 0–4)
SODIUM SERPL-SCNC: 141 MEQ/L (ref 135–144)
TRIGLYCERIDE, FASTING: 75 MG/DL (ref 0–150)

## 2023-11-30 PROCEDURE — 36415 COLL VENOUS BLD VENIPUNCTURE: CPT | Performed by: FAMILY MEDICINE

## 2023-11-30 PROCEDURE — G8484 FLU IMMUNIZE NO ADMIN: HCPCS | Performed by: FAMILY MEDICINE

## 2023-11-30 PROCEDURE — 80061 LIPID PANEL: CPT

## 2023-11-30 PROCEDURE — 99396 PREV VISIT EST AGE 40-64: CPT | Performed by: FAMILY MEDICINE

## 2023-11-30 PROCEDURE — 3074F SYST BP LT 130 MM HG: CPT | Performed by: FAMILY MEDICINE

## 2023-11-30 PROCEDURE — 80053 COMPREHEN METABOLIC PANEL: CPT

## 2023-11-30 PROCEDURE — 3079F DIAST BP 80-89 MM HG: CPT | Performed by: FAMILY MEDICINE

## 2023-11-30 PROCEDURE — 84153 ASSAY OF PSA TOTAL: CPT

## 2023-11-30 RX ORDER — PREDNISONE 20 MG/1
60 TABLET ORAL DAILY
Qty: 30 TABLET | Refills: 0 | Status: SHIPPED | OUTPATIENT
Start: 2023-11-30 | End: 2023-12-10

## 2023-11-30 ASSESSMENT — ENCOUNTER SYMPTOMS
WHEEZING: 0
COUGH: 0
SORE THROAT: 0
CONSTIPATION: 0
RHINORRHEA: 0
DIARRHEA: 0
ABDOMINAL PAIN: 0
SHORTNESS OF BREATH: 0

## 2023-11-30 NOTE — PROGRESS NOTES
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 HENT:  Negative for congestion, rhinorrhea and sore throat. Respiratory:  Negative for cough, shortness of breath and wheezing. Gastrointestinal:  Negative for abdominal pain, constipation and diarrhea. Endocrine: Negative for polydipsia and polyuria. Genitourinary:  Negative for dysuria, frequency and urgency. Neurological:  Negative for syncope, light-headedness, numbness and headaches. Psychiatric/Behavioral:  Negative for sleep disturbance. The patient is not nervous/anxious. No past medical history on file.   Past Surgical History:   Procedure Laterality Date    APPENDECTOMY      EYE SURGERY      TONSILLECTOMY      UPPER GASTROINTESTINAL ENDOSCOPY  12    DR Jagdish Mohamud     Social History     Socioeconomic History    Marital status:      Spouse name: Not on file    Number of children: Not on file    Years of education: Not on file    Highest education level: Not on file   Occupational History    Not on file   Tobacco Use    Smoking status: Former     Packs/day: 1.00     Years: 40.00     Additional pack years: 0.00     Total pack years: 40.00     Types: Cigarettes     Start date: 16     Quit date: 2019     Years since quittin.9    Smokeless tobacco: Never   Vaping Use    Vaping Use: Former    Substances: Nicotine   Substance and Sexual Activity    Alcohol use: Not Currently    Drug use: No    Sexual activity: Not on file   Other Topics Concern    Not on file   Social History Narrative    Not on file     Social Addended by: AIDEE HAWKINS on: 4/6/2021 04:57 PM     Modules accepted: Orders

## 2023-12-05 ENCOUNTER — TELEPHONE (OUTPATIENT)
Dept: FAMILY MEDICINE CLINIC | Age: 58
End: 2023-12-05

## 2023-12-14 PROBLEM — E78.5 DYSLIPIDEMIA: Status: ACTIVE | Noted: 2023-12-14

## 2023-12-16 PROBLEM — J96.01 ACUTE HYPOXEMIC RESPIRATORY FAILURE (HCC): Status: ACTIVE | Noted: 2023-12-16

## 2023-12-22 ENCOUNTER — HOSPITAL ENCOUNTER (OUTPATIENT)
Dept: CT IMAGING | Age: 58
Discharge: HOME | End: 2023-12-24
Attending: INTERNAL MEDICINE
Payer: COMMERCIAL

## 2023-12-22 DIAGNOSIS — Z87.891 PERSONAL HISTORY OF TOBACCO USE: ICD-10-CM

## 2023-12-22 PROCEDURE — 71271 CT THORAX LUNG CANCER SCR C-: CPT

## 2023-12-26 ENCOUNTER — OFFICE VISIT (OUTPATIENT)
Dept: PULMONOLOGY | Age: 58
End: 2023-12-26
Payer: COMMERCIAL

## 2023-12-26 VITALS
OXYGEN SATURATION: 99 % | DIASTOLIC BLOOD PRESSURE: 78 MMHG | SYSTOLIC BLOOD PRESSURE: 128 MMHG | TEMPERATURE: 97.6 F | BODY MASS INDEX: 20.45 KG/M2 | HEIGHT: 72 IN | HEART RATE: 98 BPM | WEIGHT: 151 LBS

## 2023-12-26 DIAGNOSIS — J44.9 CHRONIC OBSTRUCTIVE PULMONARY DISEASE, UNSPECIFIED COPD TYPE (HCC): Primary | ICD-10-CM

## 2023-12-26 DIAGNOSIS — I51.89 DIASTOLIC DYSFUNCTION: ICD-10-CM

## 2023-12-26 DIAGNOSIS — G47.34 NOCTURNAL HYPOXEMIA: ICD-10-CM

## 2023-12-26 DIAGNOSIS — F17.200 SMOKING: ICD-10-CM

## 2023-12-26 DIAGNOSIS — R91.1 LUNG NODULE: ICD-10-CM

## 2023-12-26 PROCEDURE — 3074F SYST BP LT 130 MM HG: CPT | Performed by: INTERNAL MEDICINE

## 2023-12-26 PROCEDURE — 3078F DIAST BP <80 MM HG: CPT | Performed by: INTERNAL MEDICINE

## 2023-12-26 PROCEDURE — 3023F SPIROM DOC REV: CPT | Performed by: INTERNAL MEDICINE

## 2023-12-26 PROCEDURE — 1036F TOBACCO NON-USER: CPT | Performed by: INTERNAL MEDICINE

## 2023-12-26 PROCEDURE — G8484 FLU IMMUNIZE NO ADMIN: HCPCS | Performed by: INTERNAL MEDICINE

## 2023-12-26 PROCEDURE — 99214 OFFICE O/P EST MOD 30 MIN: CPT | Performed by: INTERNAL MEDICINE

## 2023-12-26 PROCEDURE — G8420 CALC BMI NORM PARAMETERS: HCPCS | Performed by: INTERNAL MEDICINE

## 2023-12-26 PROCEDURE — 3017F COLORECTAL CA SCREEN DOC REV: CPT | Performed by: INTERNAL MEDICINE

## 2023-12-26 PROCEDURE — 1111F DSCHRG MED/CURRENT MED MERGE: CPT | Performed by: INTERNAL MEDICINE

## 2023-12-26 PROCEDURE — G8427 DOCREV CUR MEDS BY ELIG CLIN: HCPCS | Performed by: INTERNAL MEDICINE

## 2023-12-26 NOTE — PROGRESS NOTES
Subjective:     Jad Greco is a 59 y.o. male who complains today of:     Chief Complaint   Patient presents with    Follow-up     6month     COPD       HPI  Patient presents for COPD  12/26/2023  Presents for follow-up, he is smoking again almost half a pack per day started in September, he had recent exacerbation and congestion, treated with tapered dose prednisone still on prednisone tapering currently on the lowest dose. No chest pain, no coughing, shortness of breath exertional and at baseline, no lower extremity edema, no fever no chills, no nasal congestion or postnasal drip. 6/1/2023  Doing good, symptoms controlled, no coughing, no chest pain, he has mild morning cough, no fever no chills, no nasal congestion or postnasal drip and no heartburn. His weight is stable. He is on 2 L O2 while asleep. 12/1/2022  Doing good, symptoms controlled, no chest pain, no shortness of breath, active able to do his daily activities with no issues, minimal coughing, significantly improved after starting Trelegy, compliant with his treatment, he is on O2 2 L/min while asleep, and compliant, no fever no chills, no lower extremity edema, no heartburn, no nasal congestion or postnasal drip        Allergies:  Patient has no known allergies. No past medical history on file.   Past Surgical History:   Procedure Laterality Date    APPENDECTOMY      EYE SURGERY      TONSILLECTOMY      UPPER GASTROINTESTINAL ENDOSCOPY  11/30/12    DR Kenan Forrester     Family History   Problem Relation Age of Onset    Other Mother     Diabetes Father     Atrial Fibrillation Brother     Heart Attack Paternal Grandfather      Social History     Socioeconomic History    Marital status:      Spouse name: Not on file    Number of children: Not on file    Years of education: Not on file    Highest education level: Not on file   Occupational History    Not on file   Tobacco Use    Smoking status: Former     Current packs/day: 0.00

## 2024-01-10 ENCOUNTER — OFFICE VISIT (OUTPATIENT)
Dept: FAMILY MEDICINE CLINIC | Age: 59
End: 2024-01-10
Payer: COMMERCIAL

## 2024-01-10 VITALS
HEART RATE: 90 BPM | DIASTOLIC BLOOD PRESSURE: 78 MMHG | HEIGHT: 72 IN | WEIGHT: 156.4 LBS | OXYGEN SATURATION: 91 % | SYSTOLIC BLOOD PRESSURE: 110 MMHG | BODY MASS INDEX: 21.18 KG/M2

## 2024-01-10 DIAGNOSIS — I10 PRIMARY HYPERTENSION: ICD-10-CM

## 2024-01-10 DIAGNOSIS — Z09 HOSPITAL DISCHARGE FOLLOW-UP: ICD-10-CM

## 2024-01-10 DIAGNOSIS — F17.200 CURRENT SMOKER: ICD-10-CM

## 2024-01-10 DIAGNOSIS — J44.9 CHRONIC OBSTRUCTIVE PULMONARY DISEASE, UNSPECIFIED COPD TYPE (HCC): ICD-10-CM

## 2024-01-10 DIAGNOSIS — J96.01 ACUTE HYPOXIC RESPIRATORY FAILURE (HCC): Primary | ICD-10-CM

## 2024-01-10 PROCEDURE — 3017F COLORECTAL CA SCREEN DOC REV: CPT | Performed by: NURSE PRACTITIONER

## 2024-01-10 PROCEDURE — G8484 FLU IMMUNIZE NO ADMIN: HCPCS | Performed by: NURSE PRACTITIONER

## 2024-01-10 PROCEDURE — 3078F DIAST BP <80 MM HG: CPT | Performed by: NURSE PRACTITIONER

## 2024-01-10 PROCEDURE — 1111F DSCHRG MED/CURRENT MED MERGE: CPT | Performed by: NURSE PRACTITIONER

## 2024-01-10 PROCEDURE — 3074F SYST BP LT 130 MM HG: CPT | Performed by: NURSE PRACTITIONER

## 2024-01-10 PROCEDURE — G8420 CALC BMI NORM PARAMETERS: HCPCS | Performed by: NURSE PRACTITIONER

## 2024-01-10 PROCEDURE — 1036F TOBACCO NON-USER: CPT | Performed by: NURSE PRACTITIONER

## 2024-01-10 PROCEDURE — G8427 DOCREV CUR MEDS BY ELIG CLIN: HCPCS | Performed by: NURSE PRACTITIONER

## 2024-01-10 PROCEDURE — 99213 OFFICE O/P EST LOW 20 MIN: CPT | Performed by: NURSE PRACTITIONER

## 2024-01-10 PROCEDURE — 3023F SPIROM DOC REV: CPT | Performed by: NURSE PRACTITIONER

## 2024-01-10 ASSESSMENT — PATIENT HEALTH QUESTIONNAIRE - PHQ9
1. LITTLE INTEREST OR PLEASURE IN DOING THINGS: 0
SUM OF ALL RESPONSES TO PHQ9 QUESTIONS 1 & 2: 0
SUM OF ALL RESPONSES TO PHQ QUESTIONS 1-9: 0
2. FEELING DOWN, DEPRESSED OR HOPELESS: 0
SUM OF ALL RESPONSES TO PHQ QUESTIONS 1-9: 0

## 2024-01-10 ASSESSMENT — ENCOUNTER SYMPTOMS: COUGH: 1

## 2024-01-10 NOTE — PROGRESS NOTES
all   Food Insecurity: No Food Insecurity (12/16/2023)    Hunger Vital Sign     Worried About Running Out of Food in the Last Year: Never true     Ran Out of Food in the Last Year: Never true   Transportation Needs: No Transportation Needs (12/16/2023)    PRAPARE - Transportation     Lack of Transportation (Medical): No     Lack of Transportation (Non-Medical): No   Physical Activity: Not on file   Stress: Not on file   Social Connections: Not on file   Intimate Partner Violence: Not on file   Housing Stability: Low Risk  (12/16/2023)    Housing Stability Vital Sign     Unable to Pay for Housing in the Last Year: No     Number of Places Lived in the Last Year: 1     Unstable Housing in the Last Year: No     Family History   Problem Relation Age of Onset    Other Mother     Diabetes Father     Atrial Fibrillation Brother     Heart Attack Paternal Grandfather       No Known Allergies  Current Outpatient Medications   Medication Sig Dispense Refill    albuterol sulfate HFA (PROVENTIL;VENTOLIN;PROAIR) 108 (90 Base) MCG/ACT inhaler Inhale 2 puffs into the lungs every 6 hours as needed for Wheezing 1 each 5    amLODIPine (NORVASC) 2.5 MG tablet Take 1 tablet by mouth daily 30 tablet 3    predniSONE (DELTASONE) 10 MG tablet Take 1 tablet by mouth See Admin Instructions Take 4 tablets daily for 3 days then  2 tablets daily for 3 days then  1 tablet daily 24 tablet 0    atorvastatin (LIPITOR) 20 MG tablet Take 1 tablet by mouth daily 90 tablet 3    TRELEGY ELLIPTA 100-62.5-25 MCG/ACT AEPB inhaler Inhale 1 puff into the lungs daily 1 each 3    loratadine (CLARITIN) 10 MG tablet Take 1 tablet by mouth daily 30 tablet 5    tadalafil (CIALIS) 20 MG tablet Take 1 tablet by mouth daily as needed for Erectile Dysfunction (take 30min prior to sexual acitivity) 10 tablet 3    nystatin-triamcinolone (MYCOLOG II) 071132-4.1 UNIT/GM-% cream Apply topically 2 times daily. 30 g 1     No current facility-administered medications for this

## 2024-01-11 ASSESSMENT — ENCOUNTER SYMPTOMS
VOMITING: 0
WHEEZING: 0
RHINORRHEA: 0
SHORTNESS OF BREATH: 0
DIARRHEA: 0
TROUBLE SWALLOWING: 0
SORE THROAT: 0
NAUSEA: 0

## 2024-02-19 ENCOUNTER — OFFICE VISIT (OUTPATIENT)
Dept: CARDIOLOGY CLINIC | Age: 59
End: 2024-02-19
Payer: COMMERCIAL

## 2024-02-19 VITALS
DIASTOLIC BLOOD PRESSURE: 82 MMHG | WEIGHT: 160.4 LBS | SYSTOLIC BLOOD PRESSURE: 130 MMHG | OXYGEN SATURATION: 100 % | BODY MASS INDEX: 21.73 KG/M2 | HEIGHT: 72 IN | HEART RATE: 90 BPM

## 2024-02-19 DIAGNOSIS — F17.200 SMOKER: ICD-10-CM

## 2024-02-19 DIAGNOSIS — R06.02 SOB (SHORTNESS OF BREATH): ICD-10-CM

## 2024-02-19 DIAGNOSIS — I10 HYPERTENSION, UNSPECIFIED TYPE: ICD-10-CM

## 2024-02-19 DIAGNOSIS — R09.89 BILATERAL CAROTID BRUITS: ICD-10-CM

## 2024-02-19 DIAGNOSIS — Z00.00 PE (PHYSICAL EXAM), ANNUAL: Primary | ICD-10-CM

## 2024-02-19 DIAGNOSIS — R09.89 DIMINISHED PULSES IN LOWER EXTREMITY: ICD-10-CM

## 2024-02-19 DIAGNOSIS — J42 CHRONIC BRONCHITIS, UNSPECIFIED CHRONIC BRONCHITIS TYPE (HCC): ICD-10-CM

## 2024-02-19 PROCEDURE — 3075F SYST BP GE 130 - 139MM HG: CPT | Performed by: INTERNAL MEDICINE

## 2024-02-19 PROCEDURE — 3017F COLORECTAL CA SCREEN DOC REV: CPT | Performed by: INTERNAL MEDICINE

## 2024-02-19 PROCEDURE — 99214 OFFICE O/P EST MOD 30 MIN: CPT | Performed by: INTERNAL MEDICINE

## 2024-02-19 PROCEDURE — 1036F TOBACCO NON-USER: CPT | Performed by: INTERNAL MEDICINE

## 2024-02-19 PROCEDURE — G8427 DOCREV CUR MEDS BY ELIG CLIN: HCPCS | Performed by: INTERNAL MEDICINE

## 2024-02-19 PROCEDURE — G8420 CALC BMI NORM PARAMETERS: HCPCS | Performed by: INTERNAL MEDICINE

## 2024-02-19 PROCEDURE — 3023F SPIROM DOC REV: CPT | Performed by: INTERNAL MEDICINE

## 2024-02-19 PROCEDURE — G8484 FLU IMMUNIZE NO ADMIN: HCPCS | Performed by: INTERNAL MEDICINE

## 2024-02-19 PROCEDURE — 3079F DIAST BP 80-89 MM HG: CPT | Performed by: INTERNAL MEDICINE

## 2024-02-19 ASSESSMENT — ENCOUNTER SYMPTOMS
CHEST TIGHTNESS: 0
NAUSEA: 0
COUGH: 0
BLOOD IN STOOL: 0
WHEEZING: 0
EYES NEGATIVE: 1
STRIDOR: 0
SHORTNESS OF BREATH: 1
GASTROINTESTINAL NEGATIVE: 1

## 2024-02-19 NOTE — PROGRESS NOTES
OFFICE VISIT          Patient: Calvin Swenson  YOB: 1965  MRN: [id number]    Chief Complaint: KNIGHT COPD  Chief Complaint   Patient presents with    1 Year Follow Up    Shortness of Breath       CV Data:   CUS mild    Abd US - no AAA   SPECT negative    Echo EF 60    Subjective/HPI  referreed for KNIGHT. Saw [facility] once but Insurance does not cover them and therfore here. No prioor LHC nor stress.     23 doing better less SOB no cp no falls no bleed  takes meds.     24 back to smoking feels well very active walks a lot at work no falls no bleed.      EKG: SR 98    Smoker  No etoh  Lives alone  +FH  Work - now a .     No past medical history on file.    Past Surgical History:   Procedure Laterality Date    APPENDECTOMY      EYE SURGERY      TONSILLECTOMY      UPPER GASTROINTESTINAL ENDOSCOPY  12    DR OMALLEY       Family History   Problem Relation Age of Onset    Other Mother     Diabetes Father     Atrial Fibrillation Brother     Heart Attack Paternal Grandfather        Social History     Socioeconomic History    Marital status:      Spouse name: None    Number of children: None    Years of education: None    Highest education level: None   Tobacco Use    Smoking status: Former     Current packs/day: 0.00     Average packs/day: 1 pack/day for 40.0 years (40.0 ttl pk-yrs)     Types: Cigarettes     Start date:      Quit date: 2019     Years since quittin.1    Smokeless tobacco: Never   Vaping Use    Vaping Use: Former    Substances: Nicotine   Substance and Sexual Activity    Alcohol use: Yes    Drug use: No     Social Determinants of Health     Financial Resource Strain: Low Risk  (2023)    Overall Financial Resource Strain (CARDIA)     Difficulty of Paying Living Expenses: Not hard at all   Food Insecurity: No Food Insecurity (2023)    Hunger Vital Sign     Worried About Running Out of Food in the Last Year: Never true     Ran Out of Food in

## 2024-03-22 ENCOUNTER — OFFICE VISIT (OUTPATIENT)
Dept: FAMILY MEDICINE CLINIC | Age: 59
End: 2024-03-22
Payer: COMMERCIAL

## 2024-03-22 VITALS
SYSTOLIC BLOOD PRESSURE: 124 MMHG | HEIGHT: 72 IN | WEIGHT: 159 LBS | DIASTOLIC BLOOD PRESSURE: 72 MMHG | TEMPERATURE: 97 F | OXYGEN SATURATION: 92 % | BODY MASS INDEX: 21.54 KG/M2 | HEART RATE: 91 BPM

## 2024-03-22 DIAGNOSIS — J44.9 CHRONIC OBSTRUCTIVE PULMONARY DISEASE, UNSPECIFIED COPD TYPE (HCC): Primary | ICD-10-CM

## 2024-03-22 DIAGNOSIS — F17.200 SMOKING: ICD-10-CM

## 2024-03-22 PROCEDURE — 3078F DIAST BP <80 MM HG: CPT | Performed by: FAMILY MEDICINE

## 2024-03-22 PROCEDURE — 3074F SYST BP LT 130 MM HG: CPT | Performed by: FAMILY MEDICINE

## 2024-03-22 PROCEDURE — 3023F SPIROM DOC REV: CPT | Performed by: FAMILY MEDICINE

## 2024-03-22 PROCEDURE — 3017F COLORECTAL CA SCREEN DOC REV: CPT | Performed by: FAMILY MEDICINE

## 2024-03-22 PROCEDURE — G8427 DOCREV CUR MEDS BY ELIG CLIN: HCPCS | Performed by: FAMILY MEDICINE

## 2024-03-22 PROCEDURE — 99214 OFFICE O/P EST MOD 30 MIN: CPT | Performed by: FAMILY MEDICINE

## 2024-03-22 PROCEDURE — 1036F TOBACCO NON-USER: CPT | Performed by: FAMILY MEDICINE

## 2024-03-22 PROCEDURE — G8420 CALC BMI NORM PARAMETERS: HCPCS | Performed by: FAMILY MEDICINE

## 2024-03-22 PROCEDURE — G8484 FLU IMMUNIZE NO ADMIN: HCPCS | Performed by: FAMILY MEDICINE

## 2024-03-22 RX ORDER — NICOTINE 21 MG/24HR
1 PATCH, TRANSDERMAL 24 HOURS TRANSDERMAL EVERY 24 HOURS
Qty: 30 PATCH | Refills: 5 | Status: SHIPPED | OUTPATIENT
Start: 2024-03-22 | End: 2025-03-22

## 2024-03-22 RX ORDER — PREDNISONE 20 MG/1
TABLET ORAL
Qty: 24 TABLET | Refills: 0 | Status: SHIPPED | OUTPATIENT
Start: 2024-03-22 | End: 2024-04-03

## 2024-03-22 RX ORDER — AZITHROMYCIN 250 MG/1
TABLET, FILM COATED ORAL
Qty: 6 TABLET | Refills: 0 | Status: SHIPPED | OUTPATIENT
Start: 2024-03-22 | End: 2024-04-01

## 2024-03-22 SDOH — ECONOMIC STABILITY: FOOD INSECURITY: WITHIN THE PAST 12 MONTHS, YOU WORRIED THAT YOUR FOOD WOULD RUN OUT BEFORE YOU GOT MONEY TO BUY MORE.: NEVER TRUE

## 2024-03-22 SDOH — ECONOMIC STABILITY: FOOD INSECURITY: WITHIN THE PAST 12 MONTHS, THE FOOD YOU BOUGHT JUST DIDN'T LAST AND YOU DIDN'T HAVE MONEY TO GET MORE.: NEVER TRUE

## 2024-03-22 SDOH — ECONOMIC STABILITY: INCOME INSECURITY: HOW HARD IS IT FOR YOU TO PAY FOR THE VERY BASICS LIKE FOOD, HOUSING, MEDICAL CARE, AND HEATING?: NOT HARD AT ALL

## 2024-03-22 ASSESSMENT — ENCOUNTER SYMPTOMS
ABDOMINAL PAIN: 0
RHINORRHEA: 0
SORE THROAT: 0
COUGH: 1
SHORTNESS OF BREATH: 1
CONSTIPATION: 0

## 2024-03-22 NOTE — PROGRESS NOTES
[facility] PRIMARY CARE  [facility] [address]  [facility] [address]  Dept: [phone]  Dept Fax: [phone]  Loc: [phone]     Chief Complaint  Chief Complaint   Patient presents with    Congestion     States it is \"His usual congestion\"       HPI:  58 y.o.male who presents for the following:      COPD: x1wk congestion, cough, SOB, no fevers; no sore throat. Uses oxygen at night; still smoking but interested in patches to quit; sees      Review of Systems   Constitutional:  Negative for chills and fever.   HENT:  Negative for congestion, rhinorrhea and sore throat.    Respiratory:  Positive for cough, shortness of breath and wheezing.    Gastrointestinal:  Negative for abdominal pain, constipation and diarrhea.   Endocrine: Negative for polydipsia and polyuria.   Genitourinary:  Negative for dysuria, frequency and urgency.   Neurological:  Negative for syncope, light-headedness, numbness and headaches.   Psychiatric/Behavioral:  Negative for sleep disturbance. The patient is not nervous/anxious.        No past medical history on file.  Past Surgical History:   Procedure Laterality Date    APPENDECTOMY      EYE SURGERY      TONSILLECTOMY      UPPER GASTROINTESTINAL ENDOSCOPY  12    DR OMALLEY     Social History     Socioeconomic History    Marital status:      Spouse name: Not on file    Number of children: Not on file    Years of education: Not on file    Highest education level: Not on file   Occupational History    Not on file   Tobacco Use    Smoking status: Former     Current packs/day: 0.00     Average packs/day: 1 pack/day for 40.0 years (40.0 ttl pk-yrs)     Types: Cigarettes     Start date:      Quit date: 2019     Years since quittin.2    Smokeless tobacco: Never   Vaping Use    Vaping Use: Former    Substances: Nicotine   Substance and Sexual Activity    Alcohol use: Yes    Drug use: No    Sexual activity:

## 2024-03-29 ENCOUNTER — HOSPITAL ENCOUNTER (OUTPATIENT)
Dept: ULTRASOUND IMAGING | Age: 59
Discharge: HOME | End: 2024-03-29
Attending: INTERNAL MEDICINE
Payer: COMMERCIAL

## 2024-03-29 DIAGNOSIS — R09.89 DIMINISHED PULSES IN LOWER EXTREMITY: ICD-10-CM

## 2024-03-29 PROCEDURE — 93924 LWR XTR VASC STDY BILAT: CPT | Performed by: INTERNAL MEDICINE

## 2024-03-29 PROCEDURE — 93924 LWR XTR VASC STDY BILAT: CPT

## 2024-04-08 DIAGNOSIS — J44.9 CHRONIC OBSTRUCTIVE PULMONARY DISEASE, UNSPECIFIED COPD TYPE (HCC): ICD-10-CM

## 2024-04-09 RX ORDER — FLUTICASONE FUROATE, UMECLIDINIUM BROMIDE AND VILANTEROL TRIFENATATE 100; 62.5; 25 UG/1; UG/1; UG/1
POWDER RESPIRATORY (INHALATION)
Qty: 1 EACH | Refills: 3 | Status: SHIPPED | OUTPATIENT
Start: 2024-04-09

## 2024-04-09 NOTE — TELEPHONE ENCOUNTER
Rx requested:  Requested Prescriptions     Pending Prescriptions Disp Refills    TRELEGY ELLIPTA 100-62.5-25 MCG/ACT AEPB inhaler [Pharmacy Med Name: Trelegy Ellipta 100 mcg-62.5 mcg-25 mcg powder for inhalation]  3     Sig: Inhale 1 puff into the lungs daily       Last Office Visit:   12/26/2023      Next Visit Date:  Future Appointments   Date Time Provider Department Center   5/2/2024  9:45 AM Eric Giraldo MD [facility]

## 2024-05-02 ENCOUNTER — OFFICE VISIT (OUTPATIENT)
Dept: PULMONOLOGY | Age: 59
End: 2024-05-02
Payer: COMMERCIAL

## 2024-05-02 VITALS
OXYGEN SATURATION: 99 % | BODY MASS INDEX: 21.56 KG/M2 | DIASTOLIC BLOOD PRESSURE: 74 MMHG | HEART RATE: 72 BPM | RESPIRATION RATE: 16 BRPM | TEMPERATURE: 97.4 F | WEIGHT: 159.2 LBS | HEIGHT: 72 IN | SYSTOLIC BLOOD PRESSURE: 134 MMHG

## 2024-05-02 DIAGNOSIS — Z87.891 HISTORY OF SMOKING: ICD-10-CM

## 2024-05-02 DIAGNOSIS — I51.89 DIASTOLIC DYSFUNCTION: ICD-10-CM

## 2024-05-02 DIAGNOSIS — J44.9 CHRONIC OBSTRUCTIVE PULMONARY DISEASE, UNSPECIFIED COPD TYPE (HCC): ICD-10-CM

## 2024-05-02 DIAGNOSIS — R91.8 LUNG NODULES: ICD-10-CM

## 2024-05-02 DIAGNOSIS — G47.34 NOCTURNAL HYPOXEMIA: ICD-10-CM

## 2024-05-02 DIAGNOSIS — Z87.891 PERSONAL HISTORY OF TOBACCO USE: Primary | ICD-10-CM

## 2024-05-02 PROCEDURE — G8427 DOCREV CUR MEDS BY ELIG CLIN: HCPCS | Performed by: INTERNAL MEDICINE

## 2024-05-02 PROCEDURE — G0296 VISIT TO DETERM LDCT ELIG: HCPCS | Performed by: INTERNAL MEDICINE

## 2024-05-02 PROCEDURE — 99214 OFFICE O/P EST MOD 30 MIN: CPT | Performed by: INTERNAL MEDICINE

## 2024-05-02 PROCEDURE — 3023F SPIROM DOC REV: CPT | Performed by: INTERNAL MEDICINE

## 2024-05-02 PROCEDURE — 1036F TOBACCO NON-USER: CPT | Performed by: INTERNAL MEDICINE

## 2024-05-02 PROCEDURE — 3017F COLORECTAL CA SCREEN DOC REV: CPT | Performed by: INTERNAL MEDICINE

## 2024-05-02 PROCEDURE — 3078F DIAST BP <80 MM HG: CPT | Performed by: INTERNAL MEDICINE

## 2024-05-02 PROCEDURE — 3075F SYST BP GE 130 - 139MM HG: CPT | Performed by: INTERNAL MEDICINE

## 2024-05-02 PROCEDURE — G8420 CALC BMI NORM PARAMETERS: HCPCS | Performed by: INTERNAL MEDICINE

## 2024-05-02 RX ORDER — PREDNISONE 10 MG/1
40 TABLET ORAL DAILY
Qty: 20 TABLET | Refills: 0 | Status: SHIPPED | OUTPATIENT
Start: 2024-05-02 | End: 2024-05-07

## 2024-05-02 RX ORDER — AZITHROMYCIN 250 MG/1
TABLET, FILM COATED ORAL
Qty: 6 TABLET | Refills: 0 | Status: SHIPPED | OUTPATIENT
Start: 2024-05-02 | End: 2024-05-12

## 2024-05-02 RX ORDER — FLUTICASONE FUROATE, UMECLIDINIUM BROMIDE AND VILANTEROL TRIFENATATE 100; 62.5; 25 UG/1; UG/1; UG/1
1 POWDER RESPIRATORY (INHALATION) DAILY
Qty: 1 EACH | Refills: 5 | Status: SHIPPED | OUTPATIENT
Start: 2024-05-02

## 2024-05-02 NOTE — PROGRESS NOTES
Subjective:     Calvin Swenson is a 58 y.o. male who complains today of:     Chief Complaint   Patient presents with    Follow-up     3 Month F/U for COPD and Nocturnal Hypoxia       HPI  Patient presents for COPD    5/2/2024  Presents for follow-up, doing good, he quit smoking 8 weeks ago, symptoms improved, no chest pain, no shortness of breath, no coughing, weight is stable, no lower extremity edema, no nasal congestion or postnasal drip.    12/26/2023  Presents for follow-up, he is smoking again almost half a pack per day started in September, he had recent exacerbation and congestion, treated with tapered dose prednisone still on prednisone tapering currently on the lowest dose.  No chest pain, no coughing, shortness of breath exertional and at baseline, no lower extremity edema, no fever no chills, no nasal congestion or postnasal drip.        6/1/2023  Doing good, symptoms controlled, no coughing, no chest pain, he has mild morning cough, no fever no chills, no nasal congestion or postnasal drip and no heartburn.  His weight is stable.  He is on 2 L O2 while asleep.    12/1/2022  Doing good, symptoms controlled, no chest pain, no shortness of breath, active able to do his daily activities with no issues, minimal coughing, significantly improved after starting Trelegy, compliant with his treatment, he is on O2 2 L/min while asleep, and compliant, no fever no chills, no lower extremity edema, no heartburn, no nasal congestion or postnasal drip        Allergies:  Patient has no known allergies.  History reviewed. No pertinent past medical history.  Past Surgical History:   Procedure Laterality Date    APPENDECTOMY      EYE SURGERY      TONSILLECTOMY      UPPER GASTROINTESTINAL ENDOSCOPY  11/30/12    DR OMALLEY     Family History   Problem Relation Age of Onset    Other Mother     Diabetes Father     Atrial Fibrillation Brother     Heart Attack Paternal Grandfather      Social History     Socioeconomic History  negative...

## 2024-05-13 DIAGNOSIS — J44.9 CHRONIC OBSTRUCTIVE PULMONARY DISEASE, UNSPECIFIED COPD TYPE (HCC): ICD-10-CM

## 2024-05-13 RX ORDER — BUDESONIDE 0.5 MG/2ML
500 INHALANT ORAL 2 TIMES DAILY
Qty: 60 EACH | Refills: 3 | Status: SHIPPED | OUTPATIENT
Start: 2024-05-13

## 2024-05-13 NOTE — TELEPHONE ENCOUNTER
PT IS HAVING A COPD FLARE UP AND HE PICKED UP SOME PREDNISONE AND IT IS NOT HELPING. HE COULDN'T SLEEP WELL LAST NIGHT HE IS HAVING SOB AND COUGH. HE DOES NOT WANT TO GO TO THE ER BECAUSE HE CAN NOT AFFORD IT.       HE STATED YOU SUGGESTED A STRONGER DOSAGE OF TRELEGY AND HE WAS WONDERING IF YOU COULD SEND AN RX FOR THAT.       I PENDED TRELEGY 200.       Rx requested:  Requested Prescriptions     Pending Prescriptions Disp Refills    fluticasone-umeclidin-vilant (TRELEGY ELLIPTA) 200-62.5-25 MCG/ACT AEPB inhaler 1 each 3     Sig: Inhale 1 puff into the lungs daily       Last Office Visit:   5/2/2024      Next Visit Date:  Future Appointments   Date Time Provider Department Center   9/3/2024 10:00 AM Eric Giraldo MD [facility]

## 2024-05-13 NOTE — TELEPHONE ENCOUNTER
Patient has trelegy 200 already, I will add budesonide nebs, please ask him to come to emergency room if he is not improved with prednisone      Orders Placed This Encounter   Procedures    DME Order for Nebulizer as OP     You must complete the order parameters below and add the medical necessity documentation for this DME in a separate note.    Nebulizer with compressor  Disposable Med Nebs 2 per month  Reusable Med Nebs 1 per 6 months  Aerosol Mask 1 per month  Replacement Filters 2 per month  All other related supplies as needed per month    Frequency:  Twice daily    Diagnosis: copd    Length of Need: 12 months     Order Specific Question:   Medications being used:     Answer:   Other (Comment)     Comments:   budesonide

## 2024-05-14 DIAGNOSIS — J44.9 CHRONIC OBSTRUCTIVE PULMONARY DISEASE, UNSPECIFIED COPD TYPE (HCC): Primary | ICD-10-CM

## 2024-09-03 ENCOUNTER — OFFICE VISIT (OUTPATIENT)
Dept: PULMONOLOGY | Age: 59
End: 2024-09-03
Payer: COMMERCIAL

## 2024-09-03 VITALS
HEIGHT: 72 IN | SYSTOLIC BLOOD PRESSURE: 120 MMHG | HEART RATE: 85 BPM | WEIGHT: 163.4 LBS | TEMPERATURE: 97.4 F | BODY MASS INDEX: 22.13 KG/M2 | DIASTOLIC BLOOD PRESSURE: 84 MMHG | OXYGEN SATURATION: 99 % | RESPIRATION RATE: 18 BRPM

## 2024-09-03 DIAGNOSIS — Z87.891 PERSONAL HISTORY OF TOBACCO USE: ICD-10-CM

## 2024-09-03 DIAGNOSIS — I51.89 DIASTOLIC DYSFUNCTION: ICD-10-CM

## 2024-09-03 DIAGNOSIS — J44.9 CHRONIC OBSTRUCTIVE PULMONARY DISEASE, UNSPECIFIED COPD TYPE (HCC): Primary | ICD-10-CM

## 2024-09-03 DIAGNOSIS — G47.34 NOCTURNAL HYPOXEMIA: ICD-10-CM

## 2024-09-03 DIAGNOSIS — R91.8 LUNG NODULES: ICD-10-CM

## 2024-09-03 PROCEDURE — 99214 OFFICE O/P EST MOD 30 MIN: CPT | Performed by: INTERNAL MEDICINE

## 2024-09-03 PROCEDURE — G8427 DOCREV CUR MEDS BY ELIG CLIN: HCPCS | Performed by: INTERNAL MEDICINE

## 2024-09-03 PROCEDURE — 3017F COLORECTAL CA SCREEN DOC REV: CPT | Performed by: INTERNAL MEDICINE

## 2024-09-03 PROCEDURE — 3023F SPIROM DOC REV: CPT | Performed by: INTERNAL MEDICINE

## 2024-09-03 PROCEDURE — 3074F SYST BP LT 130 MM HG: CPT | Performed by: INTERNAL MEDICINE

## 2024-09-03 PROCEDURE — G8420 CALC BMI NORM PARAMETERS: HCPCS | Performed by: INTERNAL MEDICINE

## 2024-09-03 PROCEDURE — 3079F DIAST BP 80-89 MM HG: CPT | Performed by: INTERNAL MEDICINE

## 2024-09-03 PROCEDURE — 1036F TOBACCO NON-USER: CPT | Performed by: INTERNAL MEDICINE

## 2024-09-03 RX ORDER — BUDESONIDE 0.5 MG/2ML
500 INHALANT ORAL 2 TIMES DAILY PRN
Qty: 60 EACH | Refills: 3 | Status: SHIPPED | OUTPATIENT
Start: 2024-09-03

## 2024-09-03 NOTE — PROGRESS NOTES
Subjective:     Calvin Swenson is a 58 y.o. male who complains today of:     Chief Complaint   Patient presents with    Follow-up     4 Month F/U for COPD, Personal history of tobacco use and Nocturnal Hypoxia       HPI  Patient presents for COPD      9/3/2024  Presents for follow-up, he is doing good, symptoms controlled he is not smoking, he uses nicotine patches, no lower extremity edema, no coughing, no chest pain, he has not been needing to use rescue inhaler, currently on Trelegy, he uses budesonide occasionally, currently using as needed.  No heartburn, no lower extremity edema, weight is stable.      5/2/2024  Presents for follow-up, doing good, he quit smoking 8 weeks ago, symptoms improved, no chest pain, no shortness of breath, no coughing, weight is stable, no lower extremity edema, no nasal congestion or postnasal drip.    12/26/2023  Presents for follow-up, he is smoking again almost half a pack per day started in September, he had recent exacerbation and congestion, treated with tapered dose prednisone still on prednisone tapering currently on the lowest dose.  No chest pain, no coughing, shortness of breath exertional and at baseline, no lower extremity edema, no fever no chills, no nasal congestion or postnasal drip.        6/1/2023  Doing good, symptoms controlled, no coughing, no chest pain, he has mild morning cough, no fever no chills, no nasal congestion or postnasal drip and no heartburn.  His weight is stable.  He is on 2 L O2 while asleep.    12/1/2022  Doing good, symptoms controlled, no chest pain, no shortness of breath, active able to do his daily activities with no issues, minimal coughing, significantly improved after starting Trelegy, compliant with his treatment, he is on O2 2 L/min while asleep, and compliant, no fever no chills, no lower extremity edema, no heartburn, no nasal congestion or postnasal drip        Allergies:  Patient has no known allergies.  History reviewed. No

## 2024-12-23 ENCOUNTER — TELEPHONE (OUTPATIENT)
Dept: PULMONOLOGY | Age: 59
End: 2024-12-23

## 2024-12-23 ENCOUNTER — HOSPITAL ENCOUNTER (OUTPATIENT)
Dept: PULMONOLOGY | Age: 59
Discharge: HOME | End: 2024-12-23
Attending: INTERNAL MEDICINE
Payer: COMMERCIAL

## 2024-12-23 ENCOUNTER — HOSPITAL ENCOUNTER (OUTPATIENT)
Dept: CT IMAGING | Age: 59
Discharge: HOME | End: 2024-12-25
Attending: INTERNAL MEDICINE
Payer: COMMERCIAL

## 2024-12-23 DIAGNOSIS — R91.8 LUNG NODULES: ICD-10-CM

## 2024-12-23 DIAGNOSIS — R91.1 LUNG NODULE: ICD-10-CM

## 2024-12-23 DIAGNOSIS — J44.9 CHRONIC OBSTRUCTIVE PULMONARY DISEASE, UNSPECIFIED COPD TYPE (HCC): ICD-10-CM

## 2024-12-23 DIAGNOSIS — Z87.891 PERSONAL HISTORY OF TOBACCO USE: ICD-10-CM

## 2024-12-23 DIAGNOSIS — J44.9 CHRONIC OBSTRUCTIVE PULMONARY DISEASE, UNSPECIFIED COPD TYPE (HCC): Primary | ICD-10-CM

## 2024-12-23 PROCEDURE — 94060 EVALUATION OF WHEEZING: CPT

## 2024-12-23 PROCEDURE — 71271 CT THORAX LUNG CANCER SCR C-: CPT

## 2024-12-23 PROCEDURE — 94726 PLETHYSMOGRAPHY LUNG VOLUMES: CPT

## 2024-12-23 PROCEDURE — 94760 N-INVAS EAR/PLS OXIMETRY 1: CPT

## 2024-12-23 PROCEDURE — 6360000002 HC RX W HCPCS

## 2024-12-23 RX ORDER — ALBUTEROL SULFATE 0.83 MG/ML
SOLUTION RESPIRATORY (INHALATION)
Status: COMPLETED
Start: 2024-12-23 | End: 2024-12-23

## 2024-12-23 RX ADMIN — ALBUTEROL SULFATE 2.5 MG: 2.5 SOLUTION RESPIRATORY (INHALATION) at 10:28

## 2024-12-23 NOTE — TELEPHONE ENCOUNTER
[facility] CALLED IN TO THE OFFICE BECAUSE OF THE RESULTS OF THE PTS CT LUNG SCREENING        PLEASE ADVISE

## 2024-12-23 NOTE — TELEPHONE ENCOUNTER
I called the patient he did not answer, I recorded a voicemail will need a PET scan, and will reevaluate after that.      Please let him know if he calls back.

## 2025-01-08 ENCOUNTER — HOSPITAL ENCOUNTER (OUTPATIENT)
Dept: CT IMAGING | Age: 60
Discharge: HOME | End: 2025-01-10
Payer: COMMERCIAL

## 2025-01-08 DIAGNOSIS — R91.1 LUNG NODULE: ICD-10-CM

## 2025-01-08 PROCEDURE — 3430000000 HC RX DIAGNOSTIC RADIOPHARMACEUTICAL: Performed by: INTERNAL MEDICINE

## 2025-01-08 PROCEDURE — A9609 HC RX DIAGNOSTIC RADIOPHARMACEUTICAL: HCPCS | Performed by: INTERNAL MEDICINE

## 2025-01-08 PROCEDURE — 78815 PET IMAGE W/CT SKULL-THIGH: CPT

## 2025-01-08 RX ORDER — FLUDEOXYGLUCOSE F 18 200 MCI/ML
16.1 INJECTION, SOLUTION INTRAVENOUS
Status: COMPLETED | OUTPATIENT
Start: 2025-01-08 | End: 2025-01-08

## 2025-01-08 RX ADMIN — FLUDEOXYGLUCOSE F 18 16.1 MILLICURIE: 200 INJECTION, SOLUTION INTRAVENOUS at 08:30

## 2025-01-13 ENCOUNTER — OFFICE VISIT (OUTPATIENT)
Dept: PULMONOLOGY | Age: 60
End: 2025-01-13
Payer: COMMERCIAL

## 2025-01-13 VITALS
BODY MASS INDEX: 23.35 KG/M2 | RESPIRATION RATE: 18 BRPM | HEART RATE: 90 BPM | OXYGEN SATURATION: 98 % | HEIGHT: 72 IN | SYSTOLIC BLOOD PRESSURE: 123 MMHG | TEMPERATURE: 97 F | DIASTOLIC BLOOD PRESSURE: 82 MMHG | WEIGHT: 172.4 LBS

## 2025-01-13 DIAGNOSIS — J44.9 CHRONIC OBSTRUCTIVE PULMONARY DISEASE, UNSPECIFIED COPD TYPE (HCC): ICD-10-CM

## 2025-01-13 DIAGNOSIS — G47.34 NOCTURNAL HYPOXEMIA: ICD-10-CM

## 2025-01-13 DIAGNOSIS — I51.89 DIASTOLIC DYSFUNCTION: ICD-10-CM

## 2025-01-13 DIAGNOSIS — R91.1 LUNG NODULE: Primary | ICD-10-CM

## 2025-01-13 DIAGNOSIS — Z87.891 HISTORY OF SMOKING: ICD-10-CM

## 2025-01-13 PROCEDURE — G8427 DOCREV CUR MEDS BY ELIG CLIN: HCPCS | Performed by: INTERNAL MEDICINE

## 2025-01-13 PROCEDURE — 99214 OFFICE O/P EST MOD 30 MIN: CPT | Performed by: INTERNAL MEDICINE

## 2025-01-13 PROCEDURE — 1036F TOBACCO NON-USER: CPT | Performed by: INTERNAL MEDICINE

## 2025-01-13 PROCEDURE — 3074F SYST BP LT 130 MM HG: CPT | Performed by: INTERNAL MEDICINE

## 2025-01-13 PROCEDURE — 3017F COLORECTAL CA SCREEN DOC REV: CPT | Performed by: INTERNAL MEDICINE

## 2025-01-13 PROCEDURE — 3079F DIAST BP 80-89 MM HG: CPT | Performed by: INTERNAL MEDICINE

## 2025-01-13 PROCEDURE — 3023F SPIROM DOC REV: CPT | Performed by: INTERNAL MEDICINE

## 2025-01-13 PROCEDURE — G8420 CALC BMI NORM PARAMETERS: HCPCS | Performed by: INTERNAL MEDICINE

## 2025-01-13 NOTE — PROGRESS NOTES
with diastolic dysfunction    Nocturnal pulse oximetry check, shows desaturation below 89% for 5 minutes    Assessment and Plan       Diagnosis Orders   1. Lung nodule  Bernabe Cabrera MD, [facility]      2. Chronic obstructive pulmonary disease, unspecified COPD type (HCC)        3. Diastolic dysfunction        4. Nocturnal hypoxemia        5. History of smoking                  Lung nodule, malignant until proven otherwise  Referred to IR for CT-guided biopsy if positive  Consider radiation oncology for empiric therapy  Not surgical candidate  COPD, very severe,   Continue Trelegy  As needed albuterol   budesonide neb  as needed  O2 2 L/min while asleep  Yearly flu shot, RSV, COVID-19 vaccination      Orders Placed This Encounter   Procedures    Bernabe Cabrera MD, [facility]     Referral Priority:   Routine     Referral Type:   Eval and Treat     Referral Reason:   Specialty Services Required     Referred to Provider:   Bernabe Hathaway MD     Requested Specialty:   Interventional Radiology     Number of Visits Requested:   1       No orders of the defined types were placed in this encounter.           Discussed with patient the importance of exercise and weight control and  overall health and well-being.     Reviewed with the patient: current clinical status, medications, activities and diet.      Side effects, adverse effects of the medication prescribed today, as well as treatment plan and result expectations have been discussed with the patient who expresses understanding and desires to proceed.       Return in about 2 weeks (around 1/27/2025).  I spent 35 min with this patient, greater the 80% of this time was spent in counseling and/or coordinating of care.    Eric Giraldo MD

## 2025-01-14 ENCOUNTER — OFFICE VISIT (OUTPATIENT)
Dept: INTERVENTIONAL RADIOLOGY/VASCULAR | Age: 60
End: 2025-01-14
Payer: COMMERCIAL

## 2025-01-14 VITALS
RESPIRATION RATE: 17 BRPM | HEIGHT: 72 IN | WEIGHT: 172 LBS | OXYGEN SATURATION: 95 % | SYSTOLIC BLOOD PRESSURE: 119 MMHG | BODY MASS INDEX: 23.3 KG/M2 | HEART RATE: 85 BPM | DIASTOLIC BLOOD PRESSURE: 71 MMHG

## 2025-01-14 DIAGNOSIS — J44.9 CHRONIC OBSTRUCTIVE PULMONARY DISEASE, UNSPECIFIED COPD TYPE (HCC): Primary | ICD-10-CM

## 2025-01-14 DIAGNOSIS — R91.1 LUNG NODULE: ICD-10-CM

## 2025-01-14 PROCEDURE — G8427 DOCREV CUR MEDS BY ELIG CLIN: HCPCS

## 2025-01-14 PROCEDURE — G8420 CALC BMI NORM PARAMETERS: HCPCS

## 2025-01-14 PROCEDURE — 1036F TOBACCO NON-USER: CPT

## 2025-01-14 PROCEDURE — 3078F DIAST BP <80 MM HG: CPT

## 2025-01-14 PROCEDURE — 3023F SPIROM DOC REV: CPT

## 2025-01-14 PROCEDURE — 3074F SYST BP LT 130 MM HG: CPT

## 2025-01-14 PROCEDURE — 3017F COLORECTAL CA SCREEN DOC REV: CPT

## 2025-01-14 PROCEDURE — 99203 OFFICE O/P NEW LOW 30 MIN: CPT

## 2025-01-14 NOTE — PROGRESS NOTES
VASCULAR MEDICINE AND INTERVENTIONAL RADIOLOGY DEPARTMENT:     Chief Complaint   Patient presents with    New Patient     Consult- lung nodule- Dr Giraldo     HPI: Calvin Swenson, a male of 59 y.o. came to the office 2025, referred by Dr. Giraldo, for lung nodule biopsy. The nodule was first discovered on CT scan in December. He then had a PET scan that showed: suspicious metabolically active spiculated nodule in the left upper lobe. He endorses shortness of breath on exertion that is chronic and intermittent pain over the left lower ribs. He has a medical history of COPD. He denies blood/bleeding disorders. He denies liver disease. He denies cardiac disease.   Denies taking anticoagulation or blood thinners. Denies taking otc ibuprofen.  He is a former smoker. He denies drinking or drug use.    Medical history: HTN, former smoker, KNIGHT    Family History   Problem Relation Age of Onset    Other Mother     Diabetes Father     Atrial Fibrillation Brother     Heart Attack Paternal Grandfather        Past Surgical History:   Procedure Laterality Date    APPENDECTOMY      EYE SURGERY      TONSILLECTOMY      UPPER GASTROINTESTINAL ENDOSCOPY  12    DR OMALLEY        No past medical history on file.    Social History     Socioeconomic History    Marital status:    Tobacco Use    Smoking status: Former     Current packs/day: 0.00     Average packs/day: 1 pack/day for 40.0 years (40.0 ttl pk-yrs)     Types: Cigarettes     Start date:      Quit date: 2019     Years since quittin.0    Smokeless tobacco: Never   Vaping Use    Vaping status: Former    Substances: Nicotine   Substance and Sexual Activity    Alcohol use: Yes    Drug use: No     Social Determinants of Health     Financial Resource Strain: Low Risk  (3/22/2024)    Overall Financial Resource Strain (CARDIA)     Difficulty of Paying Living Expenses: Not hard at all   Food Insecurity: No Food Insecurity (3/22/2024)    Hunger Vital Sign

## 2025-01-15 DIAGNOSIS — R91.1 LUNG NODULE: Primary | ICD-10-CM

## 2025-01-15 NOTE — PROGRESS NOTES
Could you please make him an appointment with Dr. Landers, please let him know he needs to see radiation oncology

## 2025-01-16 ENCOUNTER — TELEMEDICINE (OUTPATIENT)
Dept: PULMONOLOGY | Age: 60
End: 2025-01-16

## 2025-01-16 DIAGNOSIS — R91.1 LUNG NODULE: Primary | ICD-10-CM

## 2025-01-16 DIAGNOSIS — J44.9 CHRONIC OBSTRUCTIVE PULMONARY DISEASE, UNSPECIFIED COPD TYPE (HCC): ICD-10-CM

## 2025-01-16 DIAGNOSIS — Z87.891 HISTORY OF SMOKING: ICD-10-CM

## 2025-01-16 DIAGNOSIS — G47.34 NOCTURNAL HYPOXEMIA: ICD-10-CM

## 2025-01-16 DIAGNOSIS — I51.89 DIASTOLIC DYSFUNCTION: ICD-10-CM

## 2025-01-16 ASSESSMENT — ENCOUNTER SYMPTOMS
GASTROINTESTINAL NEGATIVE: 1
EYES NEGATIVE: 1

## 2025-01-16 NOTE — PROGRESS NOTES
Take 1 tablet by mouth daily 30 tablet 3    atorvastatin (LIPITOR) 20 MG tablet Take 1 tablet by mouth daily 90 tablet 3    loratadine (CLARITIN) 10 MG tablet Take 1 tablet by mouth daily 30 tablet 5    tadalafil (CIALIS) 20 MG tablet Take 1 tablet by mouth daily as needed for Erectile Dysfunction (take 30min prior to sexual acitivity) 10 tablet 3    nystatin-triamcinolone (MYCOLOG II) 983722-9.1 UNIT/GM-% cream Apply topically 2 times daily. 30 g 1     No current facility-administered medications on file prior to visit.       No Known Allergies        Review of Systems   Review of Systems   Constitutional: Negative.    HENT: Negative.     Eyes: Negative.    Gastrointestinal: Negative.    Endocrine: Negative.    Musculoskeletal: Negative.    Skin: Negative.    Neurological: Negative.    Hematological: Negative.    Psychiatric/Behavioral: Negative.             Patient-Reported Vitals  No data recorded               PHYSICAL EXAMINATION:  [ INSTRUCTIONS:  \"[x]\" Indicates a positive item  \"[]\" Indicates a negative item  -- DELETE ALL ITEMS NOT EXAMINED]  [x] Alert  [] Oriented to person/place/time    [] No apparent distress  [] Toxic appearing    [] Face flushed appearing [] Sclera clear  [] Lips are cyanotic      [] Breathing appears normal  [] Appears tachypneic      [] No rash on visible skin    [] Cranial Nerves II-XII grossly intact    [] Motor grossly intact in visible upper extremities    [] Motor grossly intact in visible lower extremities    [] Normal Mood  [] Anxious appearing    [] Depressed appearing  [] Confused appearing      [] Poor short term memory  [] Poor long term memory    [] OTHER:      Due to this being a TeleHealth encounter, evaluation of the following organ systems is limited: Vitals/Constitutional/EENT/Resp/CV/GI//MS/Neuro/Skin/Heme-Lymph-Imm.      Objective   Imaging studies reviewed and interpreted by me reviewed with the patient, CT chest  New left upper lobe lung nodule PET positive

## 2025-01-27 ENCOUNTER — HOSPITAL ENCOUNTER (OUTPATIENT)
Dept: RADIATION ONCOLOGY | Age: 60
Discharge: HOME | End: 2025-01-27
Payer: COMMERCIAL

## 2025-01-27 VITALS
WEIGHT: 174.2 LBS | TEMPERATURE: 97.6 F | OXYGEN SATURATION: 94 % | HEIGHT: 72 IN | HEART RATE: 100 BPM | DIASTOLIC BLOOD PRESSURE: 85 MMHG | RESPIRATION RATE: 18 BRPM | BODY MASS INDEX: 23.6 KG/M2 | SYSTOLIC BLOOD PRESSURE: 132 MMHG

## 2025-01-27 DIAGNOSIS — Z87.891 FORMER SMOKER: ICD-10-CM

## 2025-01-27 DIAGNOSIS — R91.1 LUNG NODULE: Primary | ICD-10-CM

## 2025-01-27 PROCEDURE — G2211 COMPLEX E/M VISIT ADD ON: HCPCS | Performed by: RADIOLOGY

## 2025-01-27 PROCEDURE — 99212 OFFICE O/P EST SF 10 MIN: CPT | Performed by: RADIOLOGY

## 2025-01-27 PROCEDURE — 99497 ADVNCD CARE PLAN 30 MIN: CPT | Performed by: RADIOLOGY

## 2025-01-27 PROCEDURE — 99205 OFFICE O/P NEW HI 60 MIN: CPT | Performed by: RADIOLOGY

## 2025-01-27 NOTE — CONSULTS
NURSING ASSESSMENT     Date: 1/27/2025        Patient Name: Calvin Swenson     YOB: 1965      Age:  59 y.o.      MRN: [id number]       Chaperone [x] Yes   [] No  Girlfriend, Eirka    Advance Directives:   Do you currently have completed advance directives (living will)? [] Yes   [x] No         *If yes, please bring us a copy for your records.  *If no, would you like info or assistance in completing advance directives (living will)?   [x] Yes   [] No    Pain Score:   Pain Score (1-10): Mild   Pain Location: Left upper chest   Pain Duration: Intermittent for the last 30 years   Pain Management/Control: None      Is pain affecting your ability to take care of yourself or move throughout your home?                        [] Yes   [x] No    General: Age related fatigue  Patient has gained weight [] Yes   [x] No  Patient has lost weight [] Yes   [x] No  How much weight in pounds and over what length of time:     Eyes (Ophthalmic): No Problem     Skin (Dermatological): No Problems     ENT: No Problems     Respiratory: KNIGHT since 2020, somewhat worse in the last year, uses Trellegy inhaler daily and nebulizer as needed.     Cardiovascular: No Problems      Device   [] Yes   [x] No   Copy of Card Obtained [] Yes   [x] No    Gastrointestinal: Occasional diarrhea diet intake.    Genito-Urinary: No Problems     Breast: No Problems     Musculoskeletal: No Problems    Neurological: No Problems      Hematological and Lymphatic:  Thin skin, bruises easily     Endocrine: No Problems         A 10-point review of systems  has been conducted and pertinent positives have been   recorded. All other review of systems are negative    Was the patient admitted during the course of treatment OR within 30 days of treatment? No        Additional Comments: f/u with Dr. Giraldo in 4/2025    PALLIATIVE CARE SCREENING TOOL    Patient Scenarios               (Score 1 Point Each)  The Patient has  A chronic illness with uncontrolled

## 2025-01-27 NOTE — PROGRESS NOTES
SW met with pt and his significant other, Erika, as he was seen for consultation in radiation oncology for treatment of a lung nodule.  Both present as friendly and engaging, appearing to enjoy an easy camaraderie with one another. Pt endorsed a distress of 1-2/10 today, while Erika states she is worrying for both of them at 8/10. Pt states he is usually a low stress person, such that this reaction feels typical for him.    Pt reports he is employed as a  in the [employer].  He states his employer will be supportive of his need for time off, if needed, and he states he will hope to schedule treatment around his work schedule as able.  Pt also reports he lives at home alone although both state that Erika is about 5 minutes away, and available as needed. Pt reports he his father and a brother also live locally.  Pt reports he does not have children. Pt states he will plan to transport himself to treatment, and Erika adds that she will do so as needed.      Per pt's request, SW reviewed Advance Directive documents with the couple. Provided a brief explanation of the documents as well as the purpose of each.  Pt was provided documents, and states he will review them at home. He expressed he is aware that he may contact SW should he wish assistance with completing the forms.    Supportive services at the cancer center were discussed. Pt was provided with SW business card and invited to contact SW should he have further question or concern, to which he was agreeable. SW will continue to follow through radiation treatment.

## 2025-01-29 ENCOUNTER — HOSPITAL ENCOUNTER (OUTPATIENT)
Dept: RADIATION ONCOLOGY | Age: 60
Discharge: HOME | End: 2025-01-29
Payer: COMMERCIAL

## 2025-01-29 DIAGNOSIS — R91.1 LUNG NODULE: Primary | ICD-10-CM

## 2025-01-29 PROCEDURE — 77334 RADIATION TREATMENT AID(S): CPT | Performed by: RADIOLOGY

## 2025-01-29 NOTE — PROGRESS NOTES
Teaching & Instructions - Chest  General  Simulation  Initial Treatment  Self-Care Info  Nutrition  Social Service    Site-Specific  Side Effects  Cough Mgmt  Esophagitis/Pharyngitis  Fatigue Mgmt  Pain Mgmt  SOB Mgmt  Skin Care    Other/Prevention  Smoking Cessation    Educational Handouts  Radiation Therapy & You  Site Specific Instructions  Radiation Oncology Dept Information  CBMS Program  SBRT Booklet    Patient eager to learn, verbalized understanding of verbal education and handouts.

## 2025-01-30 ENCOUNTER — APPOINTMENT (OUTPATIENT)
Dept: RADIATION ONCOLOGY | Age: 60
End: 2025-01-30
Payer: COMMERCIAL

## 2025-02-03 NOTE — CONSULTS
[facility]           Radiation Oncology      [address] [place] [address]        O: [phone]        F: [phone]       [facility]                   Dr. David Landers MD PhD    CONSULT NOTE     Date of Service: 2025  Patient ID: Calvin Swenson   : 1965  MRN: [id number]   Acct Number: [id number]       Calvin wSenson  59 y.o.   1965    REFERRING PROVIDER: Kristal    PCP:  Hugo Mckee MD    DIAGNOSIS:  1. Lung nodule    2. Former smoker        STAGING: Cancer Staging   No matching staging information was found for the patient.      HISTORY OF PRESENT ILLNESS: Mr. Calvin Swenson  is a 59 y.o. year old male, former smoker, with history of moderate to severe COPD, hypertension, diastolic dysfunction, and more recent finding of a suspicious spiculated left upper lobe lung nodule that was moderately avid on PET CT scan and concerning for stage I lung cancer.  He presents to discuss the role and rationale of empiric stereotactic body radiation therapy as he is a high risk for biopsy and surgical resection given severe COPD.    His history dates back to an abnormal CT lung screen on 2024 which was read as lung RADS 4B with a suspicious spiculated nodule in the left upper lobe.  The patient's previous CT lung screen on 2023 was read as lung RADS 2.  The patient had PFTs performed in 2024 which revealed a moderately to severely restrictive pattern that did not respond to bronchodilators indicative of moderate to severe COPD.  On 2025 he had a PET CT scan which revealed a suspicious metabolically active spiculated nodule in the left upper lobe with no evidence of metastases to the hilum, mediastinum, or distally.  There was a separate inferior lingular nodule seen below the size threshold.  The patient was offered biopsy but declined due to her high risk of pneumothorax.  He saw pulmonology in follow-up on 1/15/2025 and

## 2025-02-06 PROCEDURE — 77300 RADIATION THERAPY DOSE PLAN: CPT | Performed by: RADIOLOGY

## 2025-02-06 PROCEDURE — 77301 RADIOTHERAPY DOSE PLAN IMRT: CPT | Performed by: RADIOLOGY

## 2025-02-06 PROCEDURE — 77293 RESPIRATOR MOTION MGMT SIMUL: CPT | Performed by: RADIOLOGY

## 2025-02-06 PROCEDURE — 77338 DESIGN MLC DEVICE FOR IMRT: CPT | Performed by: RADIOLOGY

## 2025-02-10 ENCOUNTER — APPOINTMENT (OUTPATIENT)
Dept: RADIATION ONCOLOGY | Age: 60
End: 2025-02-10
Payer: COMMERCIAL

## 2025-02-11 ENCOUNTER — APPOINTMENT (OUTPATIENT)
Dept: RADIATION ONCOLOGY | Age: 60
End: 2025-02-11
Payer: COMMERCIAL

## 2025-02-12 ENCOUNTER — APPOINTMENT (OUTPATIENT)
Dept: RADIATION ONCOLOGY | Age: 60
End: 2025-02-12
Payer: COMMERCIAL

## 2025-02-13 ENCOUNTER — APPOINTMENT (OUTPATIENT)
Dept: RADIATION ONCOLOGY | Age: 60
End: 2025-02-13
Payer: COMMERCIAL

## 2025-02-14 ENCOUNTER — APPOINTMENT (OUTPATIENT)
Dept: RADIATION ONCOLOGY | Age: 60
End: 2025-02-14
Payer: COMMERCIAL

## 2025-02-17 ENCOUNTER — HOSPITAL ENCOUNTER (OUTPATIENT)
Dept: RADIATION ONCOLOGY | Age: 60
Discharge: HOME | End: 2025-02-17
Payer: COMMERCIAL

## 2025-02-17 DIAGNOSIS — R91.1 LUNG NODULE: Primary | ICD-10-CM

## 2025-02-18 ENCOUNTER — HOSPITAL ENCOUNTER (OUTPATIENT)
Dept: RADIATION ONCOLOGY | Age: 60
Discharge: HOME | End: 2025-02-18
Payer: COMMERCIAL

## 2025-02-18 DIAGNOSIS — R91.1 LUNG NODULE: Primary | ICD-10-CM

## 2025-02-18 PROCEDURE — 77373 STRTCTC BDY RAD THER TX DLVR: CPT | Performed by: RADIOLOGY

## 2025-02-19 ENCOUNTER — HOSPITAL ENCOUNTER (OUTPATIENT)
Dept: RADIATION ONCOLOGY | Age: 60
Discharge: HOME | End: 2025-02-19
Payer: COMMERCIAL

## 2025-02-19 VITALS
OXYGEN SATURATION: 99 % | HEART RATE: 89 BPM | WEIGHT: 174 LBS | DIASTOLIC BLOOD PRESSURE: 80 MMHG | BODY MASS INDEX: 23.6 KG/M2 | RESPIRATION RATE: 18 BRPM | TEMPERATURE: 97.6 F | SYSTOLIC BLOOD PRESSURE: 136 MMHG

## 2025-02-19 DIAGNOSIS — R91.1 LUNG NODULE: Primary | ICD-10-CM

## 2025-02-19 PROCEDURE — 77373 STRTCTC BDY RAD THER TX DLVR: CPT | Performed by: RADIOLOGY

## 2025-02-19 NOTE — PROGRESS NOTES
[facility]           Radiation Oncology      [id number] [place] [address]        O: [phone]        F: [phone]       [facility]                   Dr. David Landers MD PhD    ON TREATMENT VISIT (OTV) NOTE     Date of Service: 2025  Patient ID: Calvin Swenson   : 1965  MRN: [id number]   Acct Number: [id number]     DIAGNOSIS:   Cancer Staging   No matching staging information was found for the patient.      Treatment Area: Thoracic    Current Total Dose(cGy): 3000  Current Fraction: 3    Patient was seen today for weekly visit.     Wt Readings from Last 3 Encounters:   25 78.9 kg (174 lb)   25 79 kg (174 lb 3.2 oz)   25 78 kg (172 lb)       /80   Pulse 89   Temp 97.6 °F (36.4 °C) (Temporal)   Resp 18   Wt 78.9 kg (174 lb)   SpO2 99%   BMI 23.60 kg/m²     Lab Results   Component Value Date    WBC 18.0 (H) 2023    HGB 14.6 2023    HCT 43.3 2023     (H) 2023       Comfort Alteration  Fatigue:None, able to perform daily activities    Pain Location: Ongoing left upper chest pain and bilateral shoulder pain  Pain Intensity (Current): 1  Pain Treatment: No treatment scheduled  Pain Relief: No relief    Emotional Alteration:   Coping: reports he is having some anxiety today.     Nutritional Alteration  Anorexia: none   Nausea: No nausea noted  Vomiting: No vomiting   Dyspepsia/Heartburn: None  Dysphagia/Esophagitis: None    Skin Alteration   Skin reaction: No changes noted  Alopecia: No loss    Mucous Membrane Alteration  Mucositis XRT Related: None  Pharynx and Esophagus- Acute: No change over baseline  Voice Changes: Normal    Ventilation Alteration  Cough: baseline cough  Hemoptysis: None  Dyspnea: baseline shortness of breath  Mucous Quantity/Quality: clear mucus    Additional Comments:     MEDICATIONS:     Current Outpatient Medications   Medication Sig Dispense Refill    budesonide

## 2025-02-20 ENCOUNTER — HOSPITAL ENCOUNTER (OUTPATIENT)
Dept: RADIATION ONCOLOGY | Age: 60
Discharge: HOME | End: 2025-02-20
Payer: COMMERCIAL

## 2025-02-20 DIAGNOSIS — R91.1 LUNG NODULE: Primary | ICD-10-CM

## 2025-02-20 PROCEDURE — 77373 STRTCTC BDY RAD THER TX DLVR: CPT | Performed by: RADIOLOGY

## 2025-02-20 NOTE — PROGRESS NOTES
Patient finishes radiation treatment tomorrow. Discharge instructions given and reviewed with patient. Follow up visit with Dr. Landers scheduled on 4/3/25.

## 2025-02-21 ENCOUNTER — HOSPITAL ENCOUNTER (OUTPATIENT)
Dept: RADIATION ONCOLOGY | Age: 60
Discharge: HOME | End: 2025-02-21
Payer: COMMERCIAL

## 2025-02-21 DIAGNOSIS — R91.1 LUNG NODULE: Primary | ICD-10-CM

## 2025-02-21 PROCEDURE — 77336 RADIATION PHYSICS CONSULT: CPT | Performed by: RADIOLOGY

## 2025-02-21 PROCEDURE — 77373 STRTCTC BDY RAD THER TX DLVR: CPT | Performed by: RADIOLOGY

## 2025-02-21 NOTE — PROGRESS NOTES
[facility]           Radiation Oncology      [address] [id number]        O: [phone]        F: [phone]       [facility]                   Dr. David Landers MD PhD    RADIATION TREATMENT SUMMARY NOTE     Date of Service: 2025  Patient ID: Calvin Swenson   : 1965  MRN: [id number]   Acct Number: [id number]       Patient Name:  Calvni Swenson,  1965,  59 y.o., male       Referring Physician: Eric Giraldo MD  [address] [place] [address]      PCP: Hugo Mckee MD       Diagnosis:  No matching staging information was found for the patient.          Recent History:  ***    Site Start TX Last TX ED Fractions Dose Fx Dose Technique   ALANNA lung 2025 4 5 5,000 cGy 1,000 cGy SBRT               Concurrent therapy:      ***    Technique:                 ***      Treatment course:       ***    Plan:  ***         David Landers MD PhD  Radiation Oncologist, [facility]    Department of Radiation Oncology  [facility]

## 2025-02-24 DIAGNOSIS — J44.9 CHRONIC OBSTRUCTIVE PULMONARY DISEASE, UNSPECIFIED COPD TYPE (HCC): ICD-10-CM

## 2025-02-24 RX ORDER — FLUTICASONE FUROATE, UMECLIDINIUM BROMIDE AND VILANTEROL TRIFENATATE 200; 62.5; 25 UG/1; UG/1; UG/1
1 POWDER RESPIRATORY (INHALATION) DAILY
Qty: 1 EACH | Refills: 3 | Status: SHIPPED | OUTPATIENT
Start: 2025-02-24

## 2025-02-24 NOTE — TELEPHONE ENCOUNTER
Rx requested:  Requested Prescriptions     Pending Prescriptions Disp Refills    fluticasone-umeclidin-vilant (TRELEGY ELLIPTA) 200-62.5-25 MCG/ACT AEPB inhaler [Pharmacy Med Name: Trelegy Ellipta 200 mcg-62.5 mcg-25 mcg powder for inhalation] 1 each 3     Sig: Inhale 1 puff into the lungs daily       Last Office Visit:   1/16/2025      Next Visit Date:  Future Appointments   Date Time Provider Department Center   4/3/2025  3:00 PM SCHEDULE, [facility]   4/16/2025  9:30 AM Eric Giraldo MD [facility]

## 2025-02-24 NOTE — TELEPHONE ENCOUNTER
Rx requested:  Requested Prescriptions     Pending Prescriptions Disp Refills    fluticasone-umeclidin-vilant (TRELEGY ELLIPTA) 200-62.5-25 MCG/ACT AEPB inhaler 1 each 3     Sig: Inhale 1 puff into the lungs daily       Last Office Visit:   1/16/2025      Next Visit Date:  Future Appointments   Date Time Provider Department Center   4/3/2025  3:00 PM SCHEDULE, [facility]   4/16/2025  9:30 AM Eric Giraldo MD [facility]

## 2025-03-31 DIAGNOSIS — J44.9 CHRONIC OBSTRUCTIVE PULMONARY DISEASE, UNSPECIFIED COPD TYPE (HCC): ICD-10-CM

## 2025-03-31 RX ORDER — AZITHROMYCIN 250 MG/1
TABLET, FILM COATED ORAL
Qty: 6 TABLET | Refills: 0 | Status: SHIPPED | OUTPATIENT
Start: 2025-03-31 | End: 2025-04-10

## 2025-03-31 RX ORDER — PREDNISONE 10 MG/1
40 TABLET ORAL DAILY
Qty: 20 TABLET | Refills: 0 | Status: SHIPPED | OUTPATIENT
Start: 2025-03-31 | End: 2025-04-05

## 2025-03-31 NOTE — TELEPHONE ENCOUNTER
Pt is having a flare up. He is sob, cough , and bringing up a lot of yellow phlegm.        Requesting refill        Rx requested:  Requested Prescriptions     Pending Prescriptions Disp Refills    azithromycin (ZITHROMAX) 250 MG tablet 6 tablet 0     Simg on day 1 followed by 250mg on days 2 - 5    predniSONE (DELTASONE) 10 MG tablet 20 tablet 0     Sig: Take 4 tablets by mouth daily for 5 days       Last Office Visit:   2025      Next Visit Date:  Future Appointments   Date Time Provider Department Center   4/3/2025  3:00 PM SCHEDULE, [facility]   2025  9:30 AM Eric Giraldo MD [facility]

## 2025-04-03 ENCOUNTER — HOSPITAL ENCOUNTER (OUTPATIENT)
Dept: RADIATION ONCOLOGY | Age: 60
Discharge: HOME | End: 2025-04-03
Payer: COMMERCIAL

## 2025-04-03 VITALS
OXYGEN SATURATION: 95 % | DIASTOLIC BLOOD PRESSURE: 64 MMHG | TEMPERATURE: 98.7 F | HEART RATE: 64 BPM | WEIGHT: 171.2 LBS | RESPIRATION RATE: 18 BRPM | BODY MASS INDEX: 23.22 KG/M2 | SYSTOLIC BLOOD PRESSURE: 135 MMHG

## 2025-04-03 DIAGNOSIS — R91.1 LUNG NODULE: Primary | ICD-10-CM

## 2025-04-03 PROCEDURE — 99212 OFFICE O/P EST SF 10 MIN: CPT | Performed by: RADIOLOGY

## 2025-04-04 NOTE — PROGRESS NOTES
NURSING ASSESSMENT     Date: 4/3/2025        Patient Name: Calvin Swenson     YOB: 1965      Age:  59 y.o.      MRN: [id number]       Chaperone [x] Yes   [] No      Advance Directives:   Do you currently have completed advance directives (living will)? [] Yes   [x] No         *If yes, please bring us a copy for your records.  *If no, would you like info or assistance in completing advance directives (living will)?   [] Yes   [x] No    Pain Score:   Pain Score (1-10): 0  Pain Location: n/a   Pain Duration: n/a  Pain Management/Control: n/a      Is pain affecting your ability to take care of yourself or move throughout your home?                        [] Yes   [x] No    General: fatigue has resolved  Patient has gained weight [] Yes   [x] No  Patient has lost weight [] Yes   [x] No  How much weight in pounds and over what length of time:     Eyes (Ophthalmic): No Problem     Skin (Dermatological): No Problems     ENT: odynophagia after treatment lasted a day or so but has resolved     Respiratory: cough and congestion started over the last weekend, productive cough phlegm was yellow now white, has been on an antibiotic and prednisone since Monday, KNIGHT heavy exertion baseline, pt does not smoke has quit for several years     Cardiovascular: No Problems      Device   [] Yes   [x] No   Copy of Card Obtained [] Yes   [x] No    Gastrointestinal: diarrhea this past weekend for 1 day which resolved    Genito-Urinary: No Problems     Breast: No Problems     Musculoskeletal: No Problems    Neurological: No Problems      Hematological and Lymphatic: No Problems     Endocrine: No Problems       A 10-point review of systems  has been conducted and pertinent positives have been   recorded. All other review of systems are negative    Was the patient admitted during the course of treatment OR within 30 days of treatment? no    Additional Comments: pt follows with Dr Giraldo 4/16/25        
sounds. No murmur heard.  Pulmonary:      Effort: Pulmonary effort is normal. No respiratory distress.      Breath sounds: Normal breath sounds.   Abdominal:      General: Abdomen is flat. Bowel sounds are normal. There is no distension.      Palpations: Abdomen is soft.   Musculoskeletal:         General: No swelling. Normal range of motion.      Cervical back: Normal range of motion and neck supple. No rigidity or tenderness.      Right lower leg: No edema.      Left lower leg: No edema.   Lymphadenopathy:      Cervical: No cervical adenopathy.   Skin:     General: Skin is warm and dry.   Neurological:      General: No focal deficit present.      Mental Status: He is alert and oriented to person, place, and time. Mental status is at baseline.      Sensory: No sensory deficit.      Motor: No weakness.   Psychiatric:         Mood and Affect: Mood normal.         Behavior: Behavior normal.        ASSESSMENT/PLAN:  This is a 59 y.o. male, former smoker, with history of moderate to severe COPD, hypertension, diastolic dysfunction, and more recent finding of a suspicious spiculated left upper lobe lung nodule that was moderately avid on PET CT scan and concerning for stage I lung cancer. He went on to receive ablative stereotactic body radiation therapy and returns for quick check 6 weeks after completing radiation.    I discussed with him that most acute sequelae of radiation therapy have resolved and he appears to be doing well.  I recommend that he continue his Z-Shyam and prednisone to complete the prescription and also advised him to start Mucinex DM to help loosen some of the secretions he is having that are causing congestion.  I will have a repeat CT chest in 1 month.  He will be seeing pulmonology in follow-up on 4/16/2025.  He is otherwise doing well and knows that we remain available should he have any additional questions or concerns.    ORDERS: CT chest without IV contrast    FOLLOW-UP: Return for routine 
Statement Selected

## 2025-04-10 ENCOUNTER — HOSPITAL ENCOUNTER (OUTPATIENT)
Dept: CT IMAGING | Age: 60
Discharge: HOME | End: 2025-04-12
Payer: COMMERCIAL

## 2025-04-10 DIAGNOSIS — R91.1 LUNG NODULE: ICD-10-CM

## 2025-04-10 PROCEDURE — 71250 CT THORAX DX C-: CPT

## 2025-04-16 ENCOUNTER — OFFICE VISIT (OUTPATIENT)
Age: 60
End: 2025-04-16
Payer: COMMERCIAL

## 2025-04-16 VITALS
BODY MASS INDEX: 23.24 KG/M2 | OXYGEN SATURATION: 94 % | HEIGHT: 72 IN | DIASTOLIC BLOOD PRESSURE: 84 MMHG | TEMPERATURE: 97.3 F | SYSTOLIC BLOOD PRESSURE: 126 MMHG | WEIGHT: 171.6 LBS | HEART RATE: 76 BPM | RESPIRATION RATE: 18 BRPM

## 2025-04-16 DIAGNOSIS — Z87.891 HISTORY OF SMOKING: ICD-10-CM

## 2025-04-16 DIAGNOSIS — G47.34 NOCTURNAL HYPOXEMIA: ICD-10-CM

## 2025-04-16 DIAGNOSIS — J44.9 CHRONIC OBSTRUCTIVE PULMONARY DISEASE, UNSPECIFIED COPD TYPE (HCC): ICD-10-CM

## 2025-04-16 DIAGNOSIS — R91.1 LUNG NODULE: Primary | ICD-10-CM

## 2025-04-16 DIAGNOSIS — I51.89 DIASTOLIC DYSFUNCTION: ICD-10-CM

## 2025-04-16 PROCEDURE — 3023F SPIROM DOC REV: CPT | Performed by: INTERNAL MEDICINE

## 2025-04-16 PROCEDURE — G8427 DOCREV CUR MEDS BY ELIG CLIN: HCPCS | Performed by: INTERNAL MEDICINE

## 2025-04-16 PROCEDURE — 3074F SYST BP LT 130 MM HG: CPT | Performed by: INTERNAL MEDICINE

## 2025-04-16 PROCEDURE — 99214 OFFICE O/P EST MOD 30 MIN: CPT | Performed by: INTERNAL MEDICINE

## 2025-04-16 PROCEDURE — 3079F DIAST BP 80-89 MM HG: CPT | Performed by: INTERNAL MEDICINE

## 2025-04-16 PROCEDURE — 3017F COLORECTAL CA SCREEN DOC REV: CPT | Performed by: INTERNAL MEDICINE

## 2025-04-16 PROCEDURE — G8420 CALC BMI NORM PARAMETERS: HCPCS | Performed by: INTERNAL MEDICINE

## 2025-04-16 PROCEDURE — 4004F PT TOBACCO SCREEN RCVD TLK: CPT | Performed by: INTERNAL MEDICINE

## 2025-04-16 RX ORDER — ENSIFENTRINE 3 MG/2.5ML
2.5 SUSPENSION RESPIRATORY (INHALATION) 2 TIMES DAILY
Qty: 150 ML | Refills: 3 | Status: SHIPPED | OUTPATIENT
Start: 2025-04-16

## 2025-04-16 NOTE — PROGRESS NOTES
Subjective:     Calvin Swenson is a 59 y.o. male who complains today of:     Chief Complaint   Patient presents with    Follow-up     3 Month F/U for COPD, Lung Nodule and Nocturnal hypoxia       HPI  Patient presents for lung nodule      4/16/2025  Presents for follow-up, he underwent radiation therapy for his left upper lobe lung nodule, he is doing good, he had recent exacerbation this is the first for almost a year, he is doing better now, no coughing, no chest pain, shortness of breath at baseline, no lower extremity edema, and weight is stable    1/13/2025  CT lung cancer screening shows left upper lobe spiculated lung nodule, PET positive,  Reviewed with patient, malignancy risk around 80%, discussed with the patient need for tissue sampling if possible.  Does not seem to be amenable to navigational bronchoscopy, no clear airway leading to the nodule.  Will refer patient to IR, if not candidate, will consider empiric radiation therapy.  COPD symptoms controlled, no shortness of breath, no coughing, no chest pain, no lower extremity edema, no heartburn, no nasal congestion or postnasal drip.  Weight is stable      9/3/2024  Presents for follow-up, he is doing good, symptoms controlled he is not smoking, he uses nicotine patches, no lower extremity edema, no coughing, no chest pain, he has not been needing to use rescue inhaler, currently on Trelegy, he uses budesonide occasionally, currently using as needed.  No heartburn, no lower extremity edema, weight is stable.      5/2/2024  Presents for follow-up, doing good, he quit smoking 8 weeks ago, symptoms improved, no chest pain, no shortness of breath, no coughing, weight is stable, no lower extremity edema, no nasal congestion or postnasal drip.    12/26/2023  Presents for follow-up, he is smoking again almost half a pack per day started in September, he had recent exacerbation and congestion, treated with tapered dose prednisone still on prednisone

## 2025-04-29 ENCOUNTER — TELEPHONE (OUTPATIENT)
Age: 60
End: 2025-04-29

## 2025-04-29 RX ORDER — ENSIFENTRINE 3 MG/2.5ML
SUSPENSION RESPIRATORY (INHALATION)
Status: CANCELLED | OUTPATIENT
Start: 2025-04-29

## 2025-04-29 RX ORDER — ENSIFENTRINE 3 MG/2.5ML
2.5 SUSPENSION RESPIRATORY (INHALATION) 2 TIMES DAILY
Qty: 150 ML | Refills: 3 | Status: SHIPPED | OUTPATIENT
Start: 2025-04-29

## 2025-04-29 NOTE — TELEPHONE ENCOUNTER
PT CALLED IN TO THE OFFICE BECAUSE HE STATES [facility] DID NOT RECEIVE THE SCRIPT TO FILL THE OHTUVAYRE.        PLEASE ADVISE

## 2025-04-30 DIAGNOSIS — J44.9 CHRONIC OBSTRUCTIVE PULMONARY DISEASE, UNSPECIFIED COPD TYPE (HCC): ICD-10-CM

## 2025-04-30 RX ORDER — ENSIFENTRINE 3 MG/2.5ML
2.5 SUSPENSION RESPIRATORY (INHALATION) 2 TIMES DAILY
Qty: 150 ML | Refills: 3 | Status: SHIPPED | OUTPATIENT
Start: 2025-04-30

## 2025-04-30 NOTE — TELEPHONE ENCOUNTER
Rx requested:  Requested Prescriptions      No prescriptions requested or ordered in this encounter       Last Office Visit:   4/16/2025      Next Visit Date:  Future Appointments   Date Time Provider Department Center   6/2/2025  8:30 AM SCHEDULE, [facility] NURSE [facility]   8/20/2025  8:30 AM Eric Giraldo MD [facility]

## 2025-06-02 ENCOUNTER — HOSPITAL ENCOUNTER (OUTPATIENT)
Dept: RADIATION ONCOLOGY | Age: 60
Discharge: HOME | End: 2025-06-02
Payer: COMMERCIAL

## 2025-06-02 VITALS
SYSTOLIC BLOOD PRESSURE: 144 MMHG | OXYGEN SATURATION: 99 % | RESPIRATION RATE: 18 BRPM | WEIGHT: 168.4 LBS | TEMPERATURE: 96.5 F | BODY MASS INDEX: 22.84 KG/M2 | DIASTOLIC BLOOD PRESSURE: 80 MMHG | HEART RATE: 68 BPM

## 2025-06-02 DIAGNOSIS — R91.1 LUNG NODULE: ICD-10-CM

## 2025-06-02 DIAGNOSIS — L98.9 SKIN LESION OF CHEEK: ICD-10-CM

## 2025-06-02 DIAGNOSIS — H61.92 SKIN LESION OF LEFT EAR: Primary | ICD-10-CM

## 2025-06-02 PROCEDURE — 99212 OFFICE O/P EST SF 10 MIN: CPT | Performed by: RADIOLOGY

## 2025-06-02 PROCEDURE — 99214 OFFICE O/P EST MOD 30 MIN: CPT | Performed by: RADIOLOGY

## 2025-06-02 PROCEDURE — G2211 COMPLEX E/M VISIT ADD ON: HCPCS | Performed by: RADIOLOGY

## 2025-06-02 NOTE — PROGRESS NOTES
NURSING ASSESSMENT     Date: 6/2/2025        Patient Name: Calvin Swenson     YOB: 1965      Age:  59 y.o.      MRN: [id number]       Chaperone [x] Yes   [] No      Advance Directives:   Do you currently have completed advance directives (living will)? [] Yes   [x] No         *If yes, please bring us a copy for your records.  *If no, would you like info or assistance in completing advance directives (living will)?   [] Yes   [x] No    Pain Score:   Pain Score (1-10): none  Pain Location: n/a  Pain Duration: n/a  Pain Management/Control: n/a      Is pain affecting your ability to take care of yourself or move throughout your home?                        [] Yes   [x] No    General: No Problems  Patient has gained weight [] Yes   [x] No  Patient has lost weight [x] Yes   [] No  How much weight in pounds and over what length of time: lost a few pounds    Eyes (Ophthalmic): No Problem     Skin (Dermatological): No Problems     ENT: No Problems     Respiratory: KNIGHT with moderate exertion is baseline but better from a year ago, dry cough in the am, uses nicotine pouches     Cardiovascular: No Problems      Device   [] Yes   [x] No   Copy of Card Obtained [] Yes   [x] No    Gastrointestinal: Diarrhea on occasion which is baseline after eating certain kinds of food    Genito-Urinary: No Problems     Breast: No Problems     Musculoskeletal: No Problems    Neurological: No Problems      Hematological and Lymphatic: No Problems     Endocrine: No Problems     A 10-point review of systems  has been conducted and pertinent positives have been   recorded. All other review of systems are negative    Was the patient admitted during the course of treatment OR within 30 days of treatment? no

## 2025-06-04 ENCOUNTER — OFFICE VISIT (OUTPATIENT)
Dept: FAMILY MEDICINE CLINIC | Age: 60
End: 2025-06-04
Payer: COMMERCIAL

## 2025-06-04 VITALS
BODY MASS INDEX: 23.01 KG/M2 | TEMPERATURE: 97.3 F | HEART RATE: 87 BPM | SYSTOLIC BLOOD PRESSURE: 132 MMHG | HEIGHT: 72 IN | DIASTOLIC BLOOD PRESSURE: 70 MMHG | WEIGHT: 169.9 LBS | OXYGEN SATURATION: 95 %

## 2025-06-04 DIAGNOSIS — D49.2 ABNORMAL SKIN GROWTH: ICD-10-CM

## 2025-06-04 DIAGNOSIS — L98.9 CHANGING SKIN LESION: ICD-10-CM

## 2025-06-04 DIAGNOSIS — Z12.83 SKIN CANCER SCREENING: Primary | ICD-10-CM

## 2025-06-04 DIAGNOSIS — L57.0 ACTINIC KERATOSES: ICD-10-CM

## 2025-06-04 PROCEDURE — 3078F DIAST BP <80 MM HG: CPT | Performed by: FAMILY MEDICINE

## 2025-06-04 PROCEDURE — 3075F SYST BP GE 130 - 139MM HG: CPT | Performed by: FAMILY MEDICINE

## 2025-06-04 PROCEDURE — G8427 DOCREV CUR MEDS BY ELIG CLIN: HCPCS | Performed by: FAMILY MEDICINE

## 2025-06-04 PROCEDURE — G8420 CALC BMI NORM PARAMETERS: HCPCS | Performed by: FAMILY MEDICINE

## 2025-06-04 PROCEDURE — 3017F COLORECTAL CA SCREEN DOC REV: CPT | Performed by: FAMILY MEDICINE

## 2025-06-04 PROCEDURE — 17000 DESTRUCT PREMALG LESION: CPT | Performed by: FAMILY MEDICINE

## 2025-06-04 PROCEDURE — 4004F PT TOBACCO SCREEN RCVD TLK: CPT | Performed by: FAMILY MEDICINE

## 2025-06-04 PROCEDURE — 99214 OFFICE O/P EST MOD 30 MIN: CPT | Performed by: FAMILY MEDICINE

## 2025-06-04 SDOH — ECONOMIC STABILITY: FOOD INSECURITY: WITHIN THE PAST 12 MONTHS, YOU WORRIED THAT YOUR FOOD WOULD RUN OUT BEFORE YOU GOT MONEY TO BUY MORE.: NEVER TRUE

## 2025-06-04 SDOH — ECONOMIC STABILITY: FOOD INSECURITY: WITHIN THE PAST 12 MONTHS, THE FOOD YOU BOUGHT JUST DIDN'T LAST AND YOU DIDN'T HAVE MONEY TO GET MORE.: NEVER TRUE

## 2025-06-04 ASSESSMENT — PATIENT HEALTH QUESTIONNAIRE - PHQ9
SUM OF ALL RESPONSES TO PHQ QUESTIONS 1-9: 0
1. LITTLE INTEREST OR PLEASURE IN DOING THINGS: NOT AT ALL
SUM OF ALL RESPONSES TO PHQ QUESTIONS 1-9: 0
2. FEELING DOWN, DEPRESSED OR HOPELESS: NOT AT ALL
SUM OF ALL RESPONSES TO PHQ QUESTIONS 1-9: 0
SUM OF ALL RESPONSES TO PHQ QUESTIONS 1-9: 0

## 2025-06-04 ASSESSMENT — ENCOUNTER SYMPTOMS: COLOR CHANGE: 1

## 2025-06-04 NOTE — PATIENT INSTRUCTIONS
Large lesion on the cheek will be scheduled for biopsy.    Actinic keratosis of the ear was elected to be treated with liquid nitrogen therapy.    Cryotherapy instructions    Post op instructions given. A printed copy provided.    It is best to leave blisters alone if they form for the first 1-3 days to allow the desired damaged tissue (precancer lesion, wart, or whatever lesion is being removed) to separate from healthy tissue.     The area should be covered with a bandage to prevent blister breakage and dirt exposure.      The wounds should remain dry while there is a blister, therefore if this is a sweaty location, like the foot ,you may need to change clothing, such as socks, multiple times per day.       Remember that the reason for cryosurgery to be done is to damage skin that is not normal so that it will be removed.  Therefore the area could be red or pink, can sting or burn for the first day or 2, will appear raw when the skin sloughs off.    When the blister(s) pop or patient removes the top as instructed between day 3-5, apply antibiotic (NOT triple antibiotic, one brand is Neosporin) ointment, usually Bacitracin, and a bandage to affected area(s).  The ointment should be applied to the open area as long as it is not covered with skin.  Exposed tissue is meant to be moist.      Once a scab is formed the patient may stop applying ointment.  The scab may appear yellow while moist, don't confuse this with infection.  If the wound is infection pus will drain from the site. If this treatment was for a large wart you may note that a plug of skin may fall out of the area that was treated.  That is the center of the wart and it is appropriate for it to come out.  If exposed skin remains, treat that area as you would a ruptured blister as mentioned above.    Bacitracin sample supplied

## 2025-06-04 NOTE — PROGRESS NOTES
Diagnosis Orders   1. Skin cancer screening        2. Abnormal skin growth        3. Changing skin lesion        4. Actinic keratoses  DESTRUC PREMALIGNANT, FIRST LESION          Return in about 3 weeks (around 6/25/2025) for shave biopsy appt.    Orders Placed This Encounter   Procedures    DESTRUC PREMALIGNANT, FIRST LESION       Edkalee was seen today for mass.    Diagnoses and all orders for this visit:    Skin cancer screening    Abnormal skin growth    Changing skin lesion    Actinic keratoses  -     DESTRUC PREMALIGNANT, FIRST LESION        Return in about 3 weeks (around 6/25/2025) for shave biopsy appt.    Patient Instructions   Large lesion on the cheek will be scheduled for biopsy.    Actinic keratosis of the ear was elected to be treated with liquid nitrogen therapy.    Cryotherapy instructions    Post op instructions given. A printed copy provided.    It is best to leave blisters alone if they form for the first 1-3 days to allow the desired damaged tissue (precancer lesion, wart, or whatever lesion is being removed) to separate from healthy tissue.     The area should be covered with a bandage to prevent blister breakage and dirt exposure.      The wounds should remain dry while there is a blister, therefore if this is a sweaty location, like the foot ,you may need to change clothing, such as socks, multiple times per day.       Remember that the reason for cryosurgery to be done is to damage skin that is not normal so that it will be removed.  Therefore the area could be red or pink, can sting or burn for the first day or 2, will appear raw when the skin sloughs off.    When the blister(s) pop or patient removes the top as instructed between day 3-5, apply antibiotic (NOT triple antibiotic, one brand is Neosporin) ointment, usually Bacitracin, and a bandage to affected area(s).  The ointment should be applied to the open area as long as it is not covered with skin.  Exposed tissue is meant to be

## 2025-07-02 ENCOUNTER — OFFICE VISIT (OUTPATIENT)
Dept: FAMILY MEDICINE CLINIC | Age: 60
End: 2025-07-02
Payer: COMMERCIAL

## 2025-07-02 VITALS — BODY MASS INDEX: 23.7 KG/M2 | OXYGEN SATURATION: 95 % | HEART RATE: 94 BPM | WEIGHT: 175 LBS | HEIGHT: 72 IN

## 2025-07-02 DIAGNOSIS — L98.9 CHANGING SKIN LESION: ICD-10-CM

## 2025-07-02 DIAGNOSIS — D49.2 ABNORMAL SKIN GROWTH: Primary | ICD-10-CM

## 2025-07-02 DIAGNOSIS — J44.9 CHRONIC OBSTRUCTIVE PULMONARY DISEASE, UNSPECIFIED COPD TYPE (HCC): ICD-10-CM

## 2025-07-02 PROCEDURE — 11102 TANGNTL BX SKIN SINGLE LES: CPT | Performed by: FAMILY MEDICINE

## 2025-07-02 RX ORDER — FLUTICASONE FUROATE, UMECLIDINIUM BROMIDE AND VILANTEROL TRIFENATATE 200; 62.5; 25 UG/1; UG/1; UG/1
1 POWDER RESPIRATORY (INHALATION) DAILY
Qty: 1 EACH | Refills: 3 | Status: SHIPPED | OUTPATIENT
Start: 2025-07-02

## 2025-07-02 NOTE — PROGRESS NOTES
Diagnosis Orders   1. Abnormal skin growth  Surgical Pathology    TANGENTIAL BIOPSY SKIN SINGLE LESION      2. Changing skin lesion  Surgical Pathology    TANGENTIAL BIOPSY SKIN SINGLE LESION            Orders Placed This Encounter   Procedures    TANGENTIAL BIOPSY SKIN SINGLE LESION     Portion of the lesion of the left zygomatic cheek    Surgical Pathology     Left zygomatic cheek, portion of lesion     PREVIOUS BIOPSY:   No     PREOP DIAGNOSIS:   Abnormal skin growth changing skin lesion     FROZEN SECTION - NO OR YES/SPECIMEN:   Yes              if indicated       Edkalee was seen today for follow-up.    Diagnoses and all orders for this visit:    Abnormal skin growth  -     Surgical Pathology  -     TANGENTIAL BIOPSY SKIN SINGLE LESION    Changing skin lesion  -     Surgical Pathology  -     TANGENTIAL BIOPSY SKIN SINGLE LESION        Return for Based on test results.    Patient Instructions   Incision/Laceration repair    -Clean surgical area with antibacterial soap and water once daily.      --You may be instructed to soak the wound with Hydrogen Peroxide to loosen scabbing around sutures, this is not to be done more often that every 3 days, should be for 30 seconds-1 min and then rinsed off with water.     -Keep surgical site moist with vaseline or antibiotic ointment (single- Bacitracin, not triple antibiotic or Neosporin) and apply a fresh bandage daily until a solid scab forms or if the wound is at risk for trauma or dirt. It is important to leave the wound uncovered part of the day to allow the skin to dry and avoid breakdown from excessive moisture. There may be exceptions to this, the staff will provide information if your wound requires a variation in this, that will often involve a prescription ointment or cream.     -Follow up immediately if any growing redness (minimal redness or pale pink is normal along wound edges) surrounds the surgical site or if dripping drainage occurs at surgical site.

## 2025-07-02 NOTE — TELEPHONE ENCOUNTER
Rx requested:  Requested Prescriptions     Pending Prescriptions Disp Refills    TRELEGY ELLIPTA 200-62.5-25 MCG/ACT AEPB inhaler [Pharmacy Med Name: Trelegy Ellipta 200 mcg-62.5 mcg-25 mcg powder for inhalation]  3     Sig: Inhale 1 puff into the lungs daily       Last Office Visit:   4/16/2025      Next Visit Date:  Future Appointments   Date Time Provider Department Center   7/2/2025  4:00 PM Chuy Ybarra MD [facility]   8/20/2025  8:30 AM Eric Giraldo MD [facility]   10/2/2025  3:30 PM SCHEDULE, [facility]

## 2025-07-08 ENCOUNTER — RESULTS FOLLOW-UP (OUTPATIENT)
Dept: FAMILY MEDICINE CLINIC | Age: 60
End: 2025-07-08

## 2025-07-12 PROBLEM — L98.9 SKIN LESION OF CHEEK: Status: ACTIVE | Noted: 2025-07-12

## 2025-08-05 DIAGNOSIS — C44.329 SQUAMOUS CELL CARCINOMA OF CHEEK: Primary | ICD-10-CM

## 2025-08-11 ENCOUNTER — HOSPITAL ENCOUNTER (OUTPATIENT)
Dept: CT IMAGING | Age: 60
Discharge: HOME | End: 2025-08-13
Payer: COMMERCIAL

## 2025-08-11 DIAGNOSIS — R91.1 LUNG NODULE: ICD-10-CM

## 2025-08-11 PROCEDURE — 71250 CT THORAX DX C-: CPT

## 2025-08-20 ENCOUNTER — OFFICE VISIT (OUTPATIENT)
Age: 60
End: 2025-08-20
Payer: COMMERCIAL

## 2025-08-20 VITALS
SYSTOLIC BLOOD PRESSURE: 122 MMHG | WEIGHT: 174 LBS | OXYGEN SATURATION: 97 % | HEIGHT: 72 IN | BODY MASS INDEX: 23.57 KG/M2 | TEMPERATURE: 97.6 F | DIASTOLIC BLOOD PRESSURE: 76 MMHG | HEART RATE: 83 BPM

## 2025-08-20 DIAGNOSIS — J44.9 CHRONIC OBSTRUCTIVE PULMONARY DISEASE, UNSPECIFIED COPD TYPE (HCC): ICD-10-CM

## 2025-08-20 DIAGNOSIS — R91.1 LUNG NODULE: Primary | ICD-10-CM

## 2025-08-20 DIAGNOSIS — I51.89 DIASTOLIC DYSFUNCTION: ICD-10-CM

## 2025-08-20 DIAGNOSIS — G47.34 NOCTURNAL HYPOXEMIA: ICD-10-CM

## 2025-08-20 DIAGNOSIS — Z87.891 HISTORY OF SMOKING: ICD-10-CM

## 2025-08-20 LAB
BASOPHILS # BLD: 0.1 K/UL (ref 0–0.2)
BASOPHILS NFR BLD: 0.5 %
EOSINOPHIL # BLD: 0.1 K/UL (ref 0–0.7)
EOSINOPHIL NFR BLD: 1.5 %
ERYTHROCYTE [DISTWIDTH] IN BLOOD BY AUTOMATED COUNT: 12 % (ref 11.5–14.5)
HCT VFR BLD AUTO: 45.7 % (ref 42–52)
HGB BLD-MCNC: 15.7 G/DL (ref 14–18)
LYMPHOCYTES # BLD: 1.4 K/UL (ref 1–4.8)
LYMPHOCYTES NFR BLD: 14.9 %
MCH RBC QN AUTO: 29.3 PG (ref 27–31.3)
MCHC RBC AUTO-ENTMCNC: 34.4 % (ref 33–37)
MCV RBC AUTO: 85.4 FL (ref 79–92.2)
MONOCYTES # BLD: 0.7 K/UL (ref 0.2–0.8)
MONOCYTES NFR BLD: 7.2 %
NEUTROPHILS # BLD: 7.1 K/UL (ref 1.4–6.5)
NEUTS SEG NFR BLD: 75.6 %
PLATELET # BLD AUTO: 299 K/UL (ref 130–400)
RBC # BLD AUTO: 5.35 M/UL (ref 4.7–6.1)
WBC # BLD AUTO: 9.4 K/UL (ref 4.8–10.8)

## 2025-08-20 PROCEDURE — 3023F SPIROM DOC REV: CPT | Performed by: INTERNAL MEDICINE

## 2025-08-20 PROCEDURE — G8427 DOCREV CUR MEDS BY ELIG CLIN: HCPCS | Performed by: INTERNAL MEDICINE

## 2025-08-20 PROCEDURE — 3017F COLORECTAL CA SCREEN DOC REV: CPT | Performed by: INTERNAL MEDICINE

## 2025-08-20 PROCEDURE — 3078F DIAST BP <80 MM HG: CPT | Performed by: INTERNAL MEDICINE

## 2025-08-20 PROCEDURE — 4004F PT TOBACCO SCREEN RCVD TLK: CPT | Performed by: INTERNAL MEDICINE

## 2025-08-20 PROCEDURE — G8420 CALC BMI NORM PARAMETERS: HCPCS | Performed by: INTERNAL MEDICINE

## 2025-08-20 PROCEDURE — 99214 OFFICE O/P EST MOD 30 MIN: CPT | Performed by: INTERNAL MEDICINE

## 2025-08-20 PROCEDURE — 3074F SYST BP LT 130 MM HG: CPT | Performed by: INTERNAL MEDICINE
